# Patient Record
Sex: FEMALE | Race: WHITE | NOT HISPANIC OR LATINO | Employment: FULL TIME | URBAN - METROPOLITAN AREA
[De-identification: names, ages, dates, MRNs, and addresses within clinical notes are randomized per-mention and may not be internally consistent; named-entity substitution may affect disease eponyms.]

---

## 2019-03-07 ENCOUNTER — HOSPITAL ENCOUNTER (OUTPATIENT)
Dept: URGENT CARE | Facility: CLINIC | Age: 39
Discharge: HOME | End: 2019-03-07
Attending: EMERGENCY MEDICINE

## 2019-05-16 ENCOUNTER — HOSPITAL ENCOUNTER (OUTPATIENT)
Dept: MAMMOGRAPHY | Facility: HOSPITAL | Age: 39
Discharge: HOME | End: 2019-05-16
Attending: OBSTETRICS & GYNECOLOGY

## 2019-07-11 ENCOUNTER — HOSPITAL ENCOUNTER (OUTPATIENT)
Dept: URGENT CARE | Facility: CLINIC | Age: 39
Discharge: HOME | End: 2019-07-11
Attending: NURSE PRACTITIONER

## 2019-09-19 ENCOUNTER — HOSPITAL ENCOUNTER (OUTPATIENT)
Dept: URGENT CARE | Facility: CLINIC | Age: 39
Discharge: HOME | End: 2019-09-19
Attending: NURSE PRACTITIONER

## 2019-09-22 LAB — BACTERIA SPEC AEROBE CULT: NORMAL

## 2022-04-07 ENCOUNTER — OFFICE VISIT (OUTPATIENT)
Dept: FAMILY MEDICINE CLINIC | Facility: CLINIC | Age: 42
End: 2022-04-07

## 2022-04-07 VITALS
TEMPERATURE: 97.7 F | HEIGHT: 63 IN | BODY MASS INDEX: 51.91 KG/M2 | SYSTOLIC BLOOD PRESSURE: 138 MMHG | WEIGHT: 293 LBS | OXYGEN SATURATION: 98 % | HEART RATE: 107 BPM | DIASTOLIC BLOOD PRESSURE: 72 MMHG

## 2022-04-07 DIAGNOSIS — J01.00 ACUTE NON-RECURRENT MAXILLARY SINUSITIS: ICD-10-CM

## 2022-04-07 DIAGNOSIS — R30.0 DYSURIA: Primary | ICD-10-CM

## 2022-04-07 PROBLEM — M54.30 SCIATICA: Status: ACTIVE | Noted: 2017-11-09

## 2022-04-07 LAB
BILIRUB BLD-MCNC: NEGATIVE MG/DL
CLARITY, POC: CLEAR
COLOR UR: YELLOW
GLUCOSE UR STRIP-MCNC: NEGATIVE MG/DL
KETONES UR QL: NEGATIVE
LEUKOCYTE EST, POC: NEGATIVE
NITRITE UR-MCNC: NEGATIVE MG/ML
PH UR: 6.5 [PH] (ref 5–8)
PROT UR STRIP-MCNC: NEGATIVE MG/DL
RBC # UR STRIP: NEGATIVE /UL
SP GR UR: 1 (ref 1–1.03)
UROBILINOGEN UR QL: NORMAL

## 2022-04-07 PROCEDURE — 99203 OFFICE O/P NEW LOW 30 MIN: CPT | Performed by: NURSE PRACTITIONER

## 2022-04-07 RX ORDER — CEPHALEXIN 500 MG/1
500 CAPSULE ORAL 2 TIMES DAILY
Qty: 20 CAPSULE | Refills: 0 | Status: SHIPPED | OUTPATIENT
Start: 2022-04-07 | End: 2022-04-17

## 2022-04-07 RX ORDER — FLUCONAZOLE 150 MG/1
150 TABLET ORAL ONCE
Qty: 1 TABLET | Refills: 0 | Status: SHIPPED | OUTPATIENT
Start: 2022-04-07 | End: 2022-04-07

## 2022-04-07 RX ORDER — PUMPKIN SEED EXTRACT/SOY GERM 300 MG
1 CAPSULE ORAL DAILY
Qty: 30 EACH | Refills: 0 | Status: SHIPPED | OUTPATIENT
Start: 2022-04-07 | End: 2022-06-07 | Stop reason: ALTCHOICE

## 2022-04-07 NOTE — PROGRESS NOTES
Chief Complaint  Earache (Right ear X3 weeks) and Urinary Frequency (And painful/burning X3 days)    Subjective        Past Medical History:   Diagnosis Date   • Allergic Age 3   • Asthma Since birth   • Diverticulosis    • Obesity    • Urinary tract infection 3-4-2022    Burning when peeing     Social History     Tobacco Use   • Smoking status: Current Every Day Smoker     Packs/day: 1.00     Years: 15.00     Pack years: 15.00     Types: Cigarettes     Start date: 1996   • Smokeless tobacco: Never Used   Substance Use Topics   • Alcohol use: Never   • Drug use: Never      No current outpatient medications on file prior to visit.     No current facility-administered medications on file prior to visit.      Allergies   Allergen Reactions   • Penicillins Rash and Swelling     Throat swelling        Health Maintenance Due   Topic Date Due   • ANNUAL PHYSICAL  Never done   • COVID-19 Vaccine (1) Never done   • TDAP/TD VACCINES (1 - Tdap) Never done   • HEPATITIS C SCREENING  Never done   • PAP SMEAR  Never done      Ashley Costa presents to [facility]  Here with ear complaint. Right ear feels clogged. Pt states she was on diamox 100mg about 1 month ago. Pain continues and is also to the front of the ear. Sinus feels congested.     Concerns for UTI. States she will have burning when she is about to start her period and at the end of the period. Notes this with the past 2 periods. States she has drank more water, eaten yogurt and drank cranberry juice. Notes urinary frequency and urgency. Denies fever or chills. States small volume voids.  Patient states that her gynecologist told her that she is nearly in full-blown menopause and had some burning with urination can be normal.  Patient also reports that she had a Pap smear that was abnormal and they went in and did a biopsy from inside her uterus was found to have a cyst that was benign but her GYN once her to go in for removal of the  "cyst.  Patient has not followed up in 5 months.    Patient also reports that she will get purplish discoloration of bilateral knuckles and a spot on her right elbow.  Patient is established with cardiology but has not followed up in some time.    Patient states she had labs done about 1 year ago the provider JACK joseph was told everything was normal.    Patient states her mother and stepfather passed away in August 2021, 4 days apart from Jazlyn. (Latha Bowers).  Patient now has custody of 2 young girls.      Objective   Vital Signs:   /72   Pulse 107   Temp 97.7 °F (36.5 °C)   Ht 160 cm (63\")   Wt (!) 138 kg (305 lb)   SpO2 98%   BMI 54.03 kg/m²     Review of Systems   Physical Exam  Vitals reviewed.   Constitutional:       General: She is not in acute distress.     Appearance: Normal appearance. She is well-developed.   HENT:      Head: Normocephalic and atraumatic.      Right Ear: There is mastoid tenderness. Tympanic membrane is bulging.      Left Ear: Hearing, tympanic membrane and ear canal normal. There is mastoid tenderness.   Eyes:      Conjunctiva/sclera: Conjunctivae normal.      Pupils: Pupils are equal, round, and reactive to light.   Cardiovascular:      Rate and Rhythm: Normal rate and regular rhythm.      Heart sounds: Normal heart sounds.   Pulmonary:      Effort: Pulmonary effort is normal.      Breath sounds: Normal breath sounds.   Musculoskeletal:      Cervical back: Neck supple.   Skin:     General: Skin is warm and dry.   Neurological:      Mental Status: She is alert and oriented to person, place, and time.   Psychiatric:         Mood and Affect: Mood and affect normal.         Behavior: Behavior normal.         Thought Content: Thought content normal.         Judgment: Judgment normal.        Result Review :   The following data was reviewed by: FANTA Hilton on 04/07/2022:  UA    Urinalysis 4/7/22   Ketones, UA Negative   Leukocytes, UA Negative                     Assessment " and Plan    Diagnoses and all orders for this visit:    1. Dysuria (Primary)  -     Methenamine-Sodium Salicylate (AZO Urinary Tract Defense) 162-162.5 MG tablet; Take 1 tablet by mouth Daily.  Dispense: 30 each; Refill: 0  -     cephalexin (Keflex) 500 MG capsule; Take 1 capsule by mouth 2 (Two) Times a Day for 10 days.  Dispense: 20 capsule; Refill: 0  -     fluconazole (DIFLUCAN) 150 MG tablet; Take 1 tablet by mouth 1 (One) Time for 1 dose.  Dispense: 1 tablet; Refill: 0  -     POCT urinalysis dipstick, manual    2. Acute non-recurrent maxillary sinusitis  -     cephalexin (Keflex) 500 MG capsule; Take 1 capsule by mouth 2 (Two) Times a Day for 10 days.  Dispense: 20 capsule; Refill: 0      Recommend patient to follow-up with cardiology and gynecology.  Follow Up   Return if symptoms worsen or fail to improve.  Patient was given instructions and counseling regarding her condition or for health maintenance advice. Please see specific information pulled into the AVS if appropriate.

## 2022-04-22 ENCOUNTER — OFFICE VISIT (OUTPATIENT)
Dept: FAMILY MEDICINE CLINIC | Facility: CLINIC | Age: 42
End: 2022-04-22

## 2022-04-22 VITALS
TEMPERATURE: 97.8 F | HEART RATE: 94 BPM | HEIGHT: 63 IN | OXYGEN SATURATION: 98 % | SYSTOLIC BLOOD PRESSURE: 158 MMHG | DIASTOLIC BLOOD PRESSURE: 80 MMHG | WEIGHT: 293 LBS | BODY MASS INDEX: 51.91 KG/M2

## 2022-04-22 DIAGNOSIS — J30.9 ALLERGIC RHINITIS, UNSPECIFIED SEASONALITY, UNSPECIFIED TRIGGER: ICD-10-CM

## 2022-04-22 DIAGNOSIS — J06.9 UPPER RESPIRATORY TRACT INFECTION, UNSPECIFIED TYPE: Primary | ICD-10-CM

## 2022-04-22 DIAGNOSIS — M79.672 LEFT FOOT PAIN: ICD-10-CM

## 2022-04-22 PROCEDURE — 99214 OFFICE O/P EST MOD 30 MIN: CPT | Performed by: NURSE PRACTITIONER

## 2022-04-22 RX ORDER — METHYLPREDNISOLONE 4 MG/1
TABLET ORAL
Qty: 1 EACH | Refills: 0 | Status: SHIPPED | OUTPATIENT
Start: 2022-04-22 | End: 2022-06-07 | Stop reason: ALTCHOICE

## 2022-04-22 RX ORDER — FLUTICASONE PROPIONATE 50 MCG
SPRAY, SUSPENSION (ML) NASAL EVERY 24 HOURS
COMMUNITY
End: 2022-06-07 | Stop reason: ALTCHOICE

## 2022-04-22 RX ORDER — AZITHROMYCIN 250 MG/1
TABLET, FILM COATED ORAL
Qty: 6 TABLET | Refills: 0 | Status: SHIPPED | OUTPATIENT
Start: 2022-04-22 | End: 2022-06-07 | Stop reason: ALTCHOICE

## 2022-04-22 RX ORDER — LORATADINE 10 MG/1
TABLET ORAL EVERY 24 HOURS
COMMUNITY
End: 2022-04-22 | Stop reason: SDUPTHER

## 2022-04-22 RX ORDER — LORATADINE 10 MG/1
10 TABLET ORAL DAILY
Qty: 90 TABLET | Refills: 1 | Status: SHIPPED | OUTPATIENT
Start: 2022-04-22 | End: 2022-09-15

## 2022-04-22 NOTE — PROGRESS NOTES
Chief Complaint  Cough and Nasal Congestion (And green drainage)    Subjective        Past Medical History:   Diagnosis Date   • Allergic Age 3   • Asthma Since birth   • Diverticulosis    • Obesity    • Urinary tract infection 3-4-2022    Burning when peeing     Social History     Tobacco Use   • Smoking status: Current Every Day Smoker     Packs/day: 1.00     Years: 15.00     Pack years: 15.00     Types: Cigarettes     Start date: 1996   • Smokeless tobacco: Never Used   Substance Use Topics   • Alcohol use: Never   • Drug use: Never      Current Outpatient Medications on File Prior to Visit   Medication Sig   • fluticasone (FLONASE) 50 MCG/ACT nasal spray Daily.   • [DISCONTINUED] loratadine (CLARITIN) 10 MG tablet Daily.   • Methenamine-Sodium Salicylate (AZO Urinary Tract Defense) 162-162.5 MG tablet Take 1 tablet by mouth Daily.     No current facility-administered medications on file prior to visit.      Allergies   Allergen Reactions   • Penicillins Rash and Swelling     Throat swelling        Health Maintenance Due   Topic Date Due   • ANNUAL PHYSICAL  Never done   • COVID-19 Vaccine (1) Never done   • Pneumococcal Vaccine 0-64 (1 - PCV) Never done   • TDAP/TD VACCINES (1 - Tdap) Never done   • HEPATITIS C SCREENING  Never done   • PAP SMEAR  Never done      Ashley Costa presents to [facility]  Here with sinus congestion and drainage of yellow mucous. Pt states similar symptoms with others in the home but they have all improved. Drainage started out clear and now is green in the morning. Pt states symptoms improved with previous antibiotic but then symptoms returned. Drainage causes coughing. Noted some chills. Used nebulizer last night.    Home covid test were negative.     Patient notes left forefoot deformity that is worsening.  Patient states it is tender to touch and painful at times.  Patient notes fracture of the foot when she was 13 years old.  Patient notes  "that her cast was placed too tight and had to be removed due to causing poor circulation.      Objective   Vital Signs:   /80 (BP Location: Left arm)   Pulse 94   Temp 97.8 °F (36.6 °C)   Ht 160 cm (63\")   Wt (!) 137 kg (302 lb)   SpO2 98%   BMI 53.50 kg/m²     Review of Systems   Respiratory: Positive for cough and wheezing. Negative for shortness of breath.       Physical Exam  Vitals reviewed.   Constitutional:       General: She is not in acute distress.     Appearance: Normal appearance. She is well-developed.   HENT:      Head: Normocephalic and atraumatic.      Right Ear: External ear normal.      Left Ear: External ear normal.   Eyes:      Conjunctiva/sclera: Conjunctivae normal.      Pupils: Pupils are equal, round, and reactive to light.   Cardiovascular:      Rate and Rhythm: Normal rate and regular rhythm.      Heart sounds: Normal heart sounds.   Pulmonary:      Effort: Pulmonary effort is normal.      Breath sounds: Normal breath sounds.   Musculoskeletal:      Cervical back: Neck supple.      Right lower leg: No edema.      Left lower leg: No edema.      Left foot: Deformity present.        Feet:    Skin:     General: Skin is warm and dry.   Neurological:      Mental Status: She is alert and oriented to person, place, and time.   Psychiatric:         Mood and Affect: Mood and affect normal.         Behavior: Behavior normal.         Thought Content: Thought content normal.         Judgment: Judgment normal.        Result Review :   The following data was reviewed by: FANTA Hilton on 04/22/2022:    Data reviewed: Radiologic studies Left foot x-ray          Assessment and Plan    Diagnoses and all orders for this visit:    1. Upper respiratory tract infection, unspecified type (Primary)  -     azithromycin (Zithromax Z-Aleks) 250 MG tablet; Take 2 tablets by mouth on day 1, then 1 tablet daily on days 2-5  Dispense: 6 tablet; Refill: 0  -     methylPREDNISolone (MEDROL) 4 MG dose " pack; Take as directed on package instructions.  Dispense: 1 each; Refill: 0    2. Allergic rhinitis, unspecified seasonality, unspecified trigger  -     loratadine (CLARITIN) 10 MG tablet; Take 1 tablet by mouth Daily.  Dispense: 90 tablet; Refill: 1    3. Left foot pain  -     XR Foot 3+ View Left (In Office)  -     Ambulatory Referral to Podiatry        Follow Up   Return if symptoms worsen or fail to improve.  Patient was given instructions and counseling regarding her condition or for health maintenance advice. Please see specific information pulled into the AVS if appropriate.       Answers for HPI/ROS submitted by the patient on 4/22/2022  Please describe your symptoms.: Nose stopped up and running green at times and ears clogged and nasel drip in throat making me cough  Have you had these symptoms before?: Yes  How long have you been having these symptoms?: 1-4 days  What is the primary reason for your visit?: Other

## 2022-04-28 ENCOUNTER — TELEPHONE (OUTPATIENT)
Dept: FAMILY MEDICINE CLINIC | Facility: CLINIC | Age: 42
End: 2022-04-28

## 2022-04-28 DIAGNOSIS — J06.9 UPPER RESPIRATORY TRACT INFECTION, UNSPECIFIED TYPE: Primary | ICD-10-CM

## 2022-04-28 RX ORDER — BROMPHENIRAMINE MALEATE, PSEUDOEPHEDRINE HYDROCHLORIDE, AND DEXTROMETHORPHAN HYDROBROMIDE 2; 30; 10 MG/5ML; MG/5ML; MG/5ML
5 SYRUP ORAL 4 TIMES DAILY PRN
Qty: 118 ML | Refills: 0 | Status: SHIPPED | OUTPATIENT
Start: 2022-04-28 | End: 2022-06-07 | Stop reason: ALTCHOICE

## 2022-04-28 NOTE — TELEPHONE ENCOUNTER
Caller: Ashley oCsta    Relationship: Self    Best call back number: [phone]    Who are you requesting to speak with (clinical staff, provider,  specific staff member): CLINICAL    What was the call regarding: PATIENT STATES THE Z-SPEEDY HELPED BUT SHE IS STILL CONGESTED. PATIENT WOULD LIKE SOMETHING CALLED IN FOR THE CONGESTION OR A CALL TO ADVISE HER ON RECOMMENDED CARE.     PHARMACY:  [address] [phone] [facility] [phone] [facility]     Do you require a callback: IF NECESSARY

## 2022-06-07 ENCOUNTER — OFFICE VISIT (OUTPATIENT)
Dept: PODIATRY | Facility: CLINIC | Age: 42
End: 2022-06-07

## 2022-06-07 VITALS
DIASTOLIC BLOOD PRESSURE: 78 MMHG | HEIGHT: 63 IN | SYSTOLIC BLOOD PRESSURE: 140 MMHG | BODY MASS INDEX: 51.91 KG/M2 | OXYGEN SATURATION: 97 % | TEMPERATURE: 97.3 F | HEART RATE: 94 BPM | WEIGHT: 293 LBS

## 2022-06-07 DIAGNOSIS — M79.672 FOOT PAIN, LEFT: Primary | ICD-10-CM

## 2022-06-07 DIAGNOSIS — M67.479 GANGLION CYST OF FOOT: ICD-10-CM

## 2022-06-07 PROCEDURE — 99243 OFF/OP CNSLTJ NEW/EST LOW 30: CPT | Performed by: PODIATRIST

## 2022-06-07 NOTE — PROGRESS NOTES
[facility] - PODIATRY    Today's Date: 06/07/22    Patient Name: Ashley Costa  MRN: [id number]  CSN: [id number]  PCP: Kecia Gusman APRN, Last PCP Visit:  4/22/2022  Referring Provider: Kecia Gusman APRN    SUBJECTIVE     Chief Complaint   Patient presents with   • Left Foot - Pain, Establish Care     HPI: Ashley Costa, a 41 y.o.female, presents to clinic.    New, Established, New Problem:  new    Location:  Dorsal Left midfoot    Duration:  Summer 2020    Onset:  insidious    Nature:  Cyst formation    Stable, worsening, improving:  Slowly worsening    Aggravating factors:  Patient relates pain is aggravated by shoe gear and ambulation.      Previous Treatment:  X-ray    Patient denies any fevers, chills, nausea, vomiting, shortness of breath, nor any other constitutional signs nor symptoms.    No other pedal complaints at this time.      Past Medical History:   Diagnosis Date   • Allergic Age 3   • Asthma Since birth   • Diverticulosis    • Obesity    • Urinary tract infection 3-4-2022    Burning when peeing     Past Surgical History:   Procedure Laterality Date   • D & C AND LAPAROSCOPY     • ESSURE TUBAL LIGATION     • WISDOM TOOTH EXTRACTION       Family History   Problem Relation Age of Onset   • Diabetes Mother    • Asthma Mother    • Hyperlipidemia Mother    • Hypertension Mother    • Heart disease Father    • Asthma Son    • Cancer Neg Hx    • Stroke Neg Hx      Social History     Socioeconomic History   • Marital status: Single   Tobacco Use   • Smoking status: Current Every Day Smoker     Packs/day: 1.00     Years: 15.00     Pack years: 15.00     Types: Cigarettes     Start date: 1996   • Smokeless tobacco: Never Used   Vaping Use   • Vaping Use: Never used   Substance and Sexual Activity   • Alcohol use: Never   • Drug use: Never   • Sexual activity: Defer     Comment: Issures     Allergies   Allergen Reactions   • Penicillins Rash and Swelling     Throat swelling        Current Outpatient Medications   Medication Sig Dispense Refill   • loratadine (CLARITIN) 10 MG tablet Take 1 tablet by mouth Daily. 90 tablet 1     No current facility-administered medications for this visit.     Review of Systems   Constitutional: Negative.    Musculoskeletal:        Cyst formation on dorsal Left midfoot   All other systems reviewed and are negative.      OBJECTIVE     Vitals:    06/07/22 1424   BP: 140/78   Pulse: 94   Temp: 97.3 °F (36.3 °C)   SpO2: 97%       No results found for: WBC, RBC, HGB, HCT, MCV, MCH, MCHC, RDW, RDWSD, MPV, PLT, NEUTRORELPCT, LYMPHORELPCT, MONORELPCT, EOSRELPCT, BASORELPCT, AUTOIGPER, NEUTROABS, LYMPHSABS, MONOSABS, EOSABS, BASOSABS, AUTOIGNUM, NRBC      No results found for: GLUCOSE, BUN, CREATININE, EGFRIFNONA, EGFRIFAFRI, BCR, K, CO2, CALCIUM, PROTENTOTREF, ALBUMIN, LABIL2, BILIRUBIN, AST, ALT    Patient seen in no apparent distress.      PHYSICAL EXAM:     Foot/Ankle Exam:       General:   Appearance comment:  Morbid obesity  Orientation: AAOx3    Affect: appropriate    Gait: unimpaired    Shoe Gear:  Sandals    VASCULAR      Right Foot Vascularity   Normal vascular exam    Dorsalis pedis:  2+  Posterior tibial:  2+  Skin Temperature: warm    Edema Grading:  None  CFT:  < 3 seconds  Pedal Hair Growth:  Present  Varicosities: mild varicosities       Left Foot Vascularity   Normal vascular exam    Dorsalis pedis:  2+  Posterior tibial:  2+  Skin Temperature: warm    Edema Grading:  None  CFT:  < 3 seconds  Pedal Hair Growth:  Present  Varicosities: mild varicosities        NEUROLOGIC     Right Foot Neurologic   Normal sensation    Light touch sensation:  Normal  Vibratory sensation:  Normal  Hot/Cold sensation: normal    Protective Sensation using [employer] Monofilament:  10     Left Foot Neurologic   Normal sensation    Light touch sensation:  Normal  Vibratory sensation:  Normal  Hot/cold sensation: normal    Protective Sensation using [employer]  Monofilament:  10     MUSCULOSKELETAL      Left Foot Musculoskeletal   Tenderness comment:  Dorsal medial midfoot     MUSCLE STRENGTH     Right Foot Muscle Strength   Foot dorsiflexion:  4-  Foot plantar flexion:  4-  Foot inversion:  4-  Foot eversion:  4-     Left Foot Muscle Strength   Foot dorsiflexion:  4-  Foot plantar flexion:  4-  Foot inversion:  4-  Foot eversion:  4-     RANGE OF MOTION      Right Foot Range of Motion   Foot and ankle ROM within normal limits       Left Foot Range of Motion   Foot and ankle ROM within normal limits       DERMATOLOGIC     Right Foot Dermatologic   Skin: skin intact    Nails: normal       Left Foot Dermatologic   Skin: skin intact    Nails: normal        Left Foot Additional Comments: Cyst formation dorsal medial midfoot.  This area correlates with degenerative changes on x-ray.  No signs of edema, erythema, lymphangitis, nor signs of infection.        RADIOLOGY:      PROCEDURE:  XR FOOT 3+ VW LEFT     COMPARISON: [facility], CR, ANKLE >OR= 3V LT, 7/29/2017, 18:26.     INDICATIONS:  left foot pain/deformity     FINDINGS:          BONES:             There is a small plantar calcaneal enthesophyte.  Degenerative changes are present in the   midfoot.  No evidence of acute fracture or dislocation.  SOFT TISSUES:            Negative.  No visible soft tissue swelling.    EFFUSION:       None visible.    OTHER:             Negative.       IMPRESSION:               Degenerative change.  No acute osseous abnormalities are identified.               GLORIA ANTHONY MD         Electronically Signed and Approved By: GLORIA ANTHONY MD on 4/22/2022 at 16:18      ASSESSMENT/PLAN     Diagnoses and all orders for this visit:    1. Foot pain, left (Primary)    2. Ganglion cyst of foot        Comprehensive lower extremity examination and evaluation was performed.    Discussed findings and treatment plan including risks, benefits, and treatment options with patient in detail. Patient agreed  with treatment plan.    Medications and allergies reviewed.  Reviewed available lab values along with other pertinent labs.  These were discussed with the patient.    Recommended surgery: Given cyst excision from dorsal left midfoot.  Upon discussion of surgical correction, post-operative requirements along with risk and benefits of the surgery, the patient states they do not want to pursue surgery at this time.      Patient is to monitor for recurrence and any new symptoms and to contact Dr. Tavares's office for a follow-up appointment.      The patient states understanding and agreement with this plan.    An After Visit Summary was printed and given to the patient at discharge, including (if requested) any available informative/educational handouts regarding diagnosis, treatment, or medications. All questions were answered to patient/family satisfaction. Should symptoms fail to improve or worsen they agree to call or return to clinic or to go to the Emergency Department. Discussed the importance of following up with any needed screening tests/labs/specialist appointments and any requested follow-up recommended by me today. Importance of maintaining follow-up discussed and patient accepts that missed appointments can delay diagnosis and potentially lead to worsening of conditions.    Return if symptoms worsen or fail to improve., or sooner if acute issues arise.    This document has been electronically signed by Rinku Tavares DPM on June 7, 2022 14:49 EDT

## 2022-09-15 ENCOUNTER — OFFICE VISIT (OUTPATIENT)
Dept: FAMILY MEDICINE CLINIC | Facility: CLINIC | Age: 42
End: 2022-09-15

## 2022-09-15 VITALS
TEMPERATURE: 96.6 F | DIASTOLIC BLOOD PRESSURE: 96 MMHG | HEART RATE: 95 BPM | SYSTOLIC BLOOD PRESSURE: 146 MMHG | OXYGEN SATURATION: 99 % | BODY MASS INDEX: 52.01 KG/M2 | WEIGHT: 293 LBS

## 2022-09-15 DIAGNOSIS — M54.50 ACUTE LEFT-SIDED LOW BACK PAIN WITHOUT SCIATICA: Primary | ICD-10-CM

## 2022-09-15 LAB
ANION GAP SERPL CALCULATED.3IONS-SCNC: 11 MMOL/L (ref 5–15)
BILIRUB BLD-MCNC: NEGATIVE MG/DL
BUN SERPL-MCNC: 12 MG/DL (ref 6–20)
BUN/CREAT SERPL: 21.1 (ref 7–25)
CALCIUM SPEC-SCNC: 9.9 MG/DL (ref 8.6–10.5)
CHLORIDE SERPL-SCNC: 104 MMOL/L (ref 98–107)
CLARITY, POC: CLEAR
CO2 SERPL-SCNC: 23 MMOL/L (ref 22–29)
COLOR UR: YELLOW
CREAT SERPL-MCNC: 0.57 MG/DL (ref 0.57–1)
EGFRCR SERPLBLD CKD-EPI 2021: 117.3 ML/MIN/1.73
EXPIRATION DATE: ABNORMAL
GLUCOSE SERPL-MCNC: 95 MG/DL (ref 65–99)
GLUCOSE UR STRIP-MCNC: NEGATIVE MG/DL
KETONES UR QL: NEGATIVE
LEUKOCYTE EST, POC: ABNORMAL
Lab: ABNORMAL
NITRITE UR-MCNC: NEGATIVE MG/ML
PH UR: 7 [PH] (ref 5–8)
POTASSIUM SERPL-SCNC: 4.5 MMOL/L (ref 3.5–5.2)
PROT UR STRIP-MCNC: NEGATIVE MG/DL
RBC # UR STRIP: NEGATIVE /UL
SODIUM SERPL-SCNC: 138 MMOL/L (ref 136–145)
SP GR UR: 1.01 (ref 1–1.03)
UROBILINOGEN UR QL: ABNORMAL

## 2022-09-15 PROCEDURE — 99213 OFFICE O/P EST LOW 20 MIN: CPT | Performed by: NURSE PRACTITIONER

## 2022-09-15 PROCEDURE — 96372 THER/PROPH/DIAG INJ SC/IM: CPT | Performed by: NURSE PRACTITIONER

## 2022-09-15 PROCEDURE — 80048 BASIC METABOLIC PNL TOTAL CA: CPT | Performed by: NURSE PRACTITIONER

## 2022-09-15 RX ORDER — METHYLPREDNISOLONE 4 MG/1
TABLET ORAL
Qty: 1 EACH | Refills: 0 | Status: SHIPPED | OUTPATIENT
Start: 2022-09-15 | End: 2022-10-15

## 2022-09-15 RX ORDER — TIZANIDINE 4 MG/1
4 TABLET ORAL NIGHTLY PRN
Qty: 30 TABLET | Refills: 0 | Status: SHIPPED | OUTPATIENT
Start: 2022-09-15 | End: 2022-10-15

## 2022-09-15 RX ORDER — KETOROLAC TROMETHAMINE 30 MG/ML
30 INJECTION, SOLUTION INTRAMUSCULAR; INTRAVENOUS ONCE
Status: DISCONTINUED | OUTPATIENT
Start: 2022-09-15 | End: 2022-09-15

## 2022-09-15 RX ORDER — KETOROLAC TROMETHAMINE 30 MG/ML
30 INJECTION, SOLUTION INTRAMUSCULAR; INTRAVENOUS ONCE
Status: COMPLETED | OUTPATIENT
Start: 2022-09-15 | End: 2022-09-15

## 2022-09-15 RX ORDER — NAPROXEN 500 MG/1
TABLET ORAL
Qty: 60 TABLET | Refills: 0 | Status: SHIPPED | OUTPATIENT
Start: 2022-09-15 | End: 2022-10-15

## 2022-09-15 RX ADMIN — KETOROLAC TROMETHAMINE 30 MG: 30 INJECTION, SOLUTION INTRAMUSCULAR; INTRAVENOUS at 12:46

## 2022-09-15 NOTE — PROGRESS NOTES
Chief Complaint  Back Pain (LT sided back pain )    Subjective            Ashley Costa presents to [facility]  History of Present Illness     Patient presents to the office today with 2-3 history of acute onset left-sided back pain.  No known injury, but she states that she does have a history of chronic lower back pain with sciatic nerve involvement.  Today, she denies any sciatica.  Denies any pain in the lower extremities; no numbness or tingling.  She denies any loss of bowel or bladder.  She has been taking over-the-counter ibuprofen, 400 mg, and Tylenol, without relief of symptoms.  She states in the past, she would see her previous primary care provider and they would give her Toradol injections and that would help her out a lot.  She also took steroids for her symptoms in the past.    She denies any gross hematuria.  Denies any burning, urgency, or frequency.  She does endorse occasional stress incontinence.        Past Medical History:   Diagnosis Date   • Allergic Age 3   • Asthma Since birth   • Diverticulosis    • Obesity    • Urinary tract infection 3-4-2022    Burning when peeing       Allergies   Allergen Reactions   • Penicillins Rash and Swelling     Throat swelling          Past Surgical History:   Procedure Laterality Date   • D & C AND LAPAROSCOPY     • ESSURE TUBAL LIGATION     • WISDOM TOOTH EXTRACTION          Social History     Tobacco Use   • Smoking status: Current Every Day Smoker     Packs/day: 1.00     Years: 15.00     Pack years: 15.00     Types: Cigarettes     Start date: 1996   • Smokeless tobacco: Never Used   Vaping Use   • Vaping Use: Never used   Substance Use Topics   • Alcohol use: Never   • Drug use: Never       Family History   Problem Relation Age of Onset   • Diabetes Mother    • Asthma Mother    • Hyperlipidemia Mother    • Hypertension Mother    • Heart disease Father    • Asthma Son    • Cancer Neg Hx    • Stroke Neg Hx         Health  Maintenance Due   Topic Date Due   • COVID-19 Vaccine (1) Never done   • ANNUAL PHYSICAL  Never done   • Pneumococcal Vaccine 0-64 (1 - PCV) Never done   • TDAP/TD VACCINES (1 - Tdap) Never done   • HEPATITIS C SCREENING  Never done   • PAP SMEAR  Never done        Current Outpatient Medications on File Prior to Visit   Medication Sig   • [DISCONTINUED] azithromycin (Zithromax Z-Aleks) 250 MG tablet Take 2 tablets by mouth on day 1, then 1 tablet daily on days 2-5   • [DISCONTINUED] diclofenac (VOLTAREN) 50 MG EC tablet Take 1 tablet by mouth 3 (Three) Times a Day. (Patient not taking: No sig reported)   • [DISCONTINUED] fluticasone (FLONASE) 50 MCG/ACT nasal spray Daily.   • [DISCONTINUED] loratadine (CLARITIN) 10 MG tablet Take 1 tablet by mouth Daily.   • [DISCONTINUED] methylPREDNISolone (MEDROL) 4 MG dose pack Take as directed on package instructions.     No current facility-administered medications on file prior to visit.         There is no immunization history on file for this patient.    Review of Systems     Objective     /96   Pulse 95   Temp 96.6 °F (35.9 °C)   Wt (!) 137 kg (303 lb)   SpO2 99%   BMI 52.01 kg/m²       Physical Exam  Vitals reviewed.   Constitutional:       General: She is not in acute distress.     Appearance: She is well-developed. She is obese.   HENT:      Head: Normocephalic and atraumatic.   Eyes:      General: No scleral icterus.     Extraocular Movements: Extraocular movements intact.      Conjunctiva/sclera: Conjunctivae normal.   Cardiovascular:      Rate and Rhythm: Normal rate and regular rhythm.      Pulses: Normal pulses.      Heart sounds: No murmur heard.  Pulmonary:      Effort: Pulmonary effort is normal. No respiratory distress.      Breath sounds: Normal breath sounds. No wheezing, rhonchi or rales.   Abdominal:      General: Bowel sounds are normal. There is no distension.      Palpations: Abdomen is soft. There is no mass.      Tenderness: There is no  abdominal tenderness. There is no right CVA tenderness, left CVA tenderness, guarding or rebound.   Musculoskeletal:      Thoracic back: Tenderness present. No swelling, deformity, spasms or bony tenderness. Decreased range of motion. No scoliosis.      Lumbar back: Tenderness present. No swelling, deformity, spasms or bony tenderness. Decreased range of motion. Negative right straight leg raise test and negative left straight leg raise test. No scoliosis.        Back:    Skin:     General: Skin is warm and dry.   Neurological:      Mental Status: She is alert and oriented to person, place, and time.   Psychiatric:         Mood and Affect: Mood and affect normal.         Behavior: Behavior normal.         Thought Content: Thought content normal.         Judgment: Judgment normal.         Result Review :     The following data was reviewed by: FANTA Sanchez on 09/15/2022:    POCT urinalysis dipstick, automated (09/15/2022 09:57)                Assessment and Plan      Diagnoses and all orders for this visit:    1. Acute left-sided low back pain without sciatica (Primary)  -     POCT urinalysis dipstick, automated  -     Basic metabolic panel  -     methylPREDNISolone (MEDROL) 4 MG dose pack; Take as directed on package instructions.  Dispense: 1 each; Refill: 0  -     tiZANidine (ZANAFLEX) 4 MG tablet; Take 1 tablet by mouth At Night As Needed for Muscle Spasms.  Dispense: 30 tablet; Refill: 0  -     naproxen (Naprosyn) 500 MG tablet; Take 1 tablet PO BID. Do not start until 09/16/2022.  Dispense: 60 tablet; Refill: 0  -     ketorolac (TORADOL) injection 30 mg  -     Discontinue: ketorolac (TORADOL) injection 30 mg            Follow Up     Return if symptoms worsen or fail to improve.     Urinalysis in the office not suggestive of urinary tract infection.  No hematuria on urinalysis, thus kidney stone is thought to be less likely.  She does have a history of lower back pain with sciatica.  No sciatica on  this occasion.  We will give her a Toradol shot today, 30 mg IM.  I will give her prescription naproxen to use starting tomorrow.  I will also provide her with a Medrol Dosepak and Zanaflex to use as needed.  She has been instructed to cut this in half for any daytime somnolence, or just take at nighttime as needed.  Voiced understanding.    She will follow-up in the office with progressive/worsening symptoms, or without resolution of symptoms within the next 7 to 10 days.  Consider lumbar spine x-ray with persistence.    Patient was given instructions and counseling regarding her condition or for health maintenance advice. Please see specific information pulled into the AVS if appropriate.

## 2022-09-15 NOTE — PROGRESS NOTES
Venipuncture Blood Specimen Collection  Venipuncture performed in left hand  by Angelica Davis with good hemostasis. Patient tolerated the procedure well without complications.      09/15/22   Angelica Davis

## 2022-11-29 ENCOUNTER — TELEMEDICINE (OUTPATIENT)
Dept: FAMILY MEDICINE CLINIC | Facility: TELEHEALTH | Age: 42
End: 2022-11-29

## 2022-11-29 VITALS — BODY MASS INDEX: 50.02 KG/M2 | TEMPERATURE: 97.6 F | WEIGHT: 293 LBS | HEIGHT: 64 IN

## 2022-11-29 DIAGNOSIS — J01.40 ACUTE PANSINUSITIS, RECURRENCE NOT SPECIFIED: Primary | ICD-10-CM

## 2022-11-29 DIAGNOSIS — J02.9 SORE THROAT: ICD-10-CM

## 2022-11-29 PROCEDURE — 99213 OFFICE O/P EST LOW 20 MIN: CPT | Performed by: NURSE PRACTITIONER

## 2022-11-29 RX ORDER — GUAIFENESIN 600 MG/1
600 TABLET, EXTENDED RELEASE ORAL 2 TIMES DAILY
Qty: 28 TABLET | Refills: 0 | Status: SHIPPED | OUTPATIENT
Start: 2022-11-29 | End: 2022-12-13

## 2022-11-29 RX ORDER — METHYLPREDNISOLONE 4 MG/1
TABLET ORAL
Qty: 21 TABLET | Refills: 0 | Status: SHIPPED | OUTPATIENT
Start: 2022-11-29 | End: 2023-02-15 | Stop reason: SDUPTHER

## 2022-11-29 RX ORDER — DOXYCYCLINE 100 MG/1
100 CAPSULE ORAL 2 TIMES DAILY
Qty: 10 CAPSULE | Refills: 0 | Status: SHIPPED | OUTPATIENT
Start: 2022-11-29 | End: 2022-12-04

## 2022-11-29 NOTE — PATIENT INSTRUCTIONS
Sinusitis, Adult  Sinusitis is inflammation of your sinuses. Sinuses are hollow spaces in the bones around your face. Your sinuses are located:  Around your eyes.  In the middle of your forehead.  Behind your nose.  In your cheekbones.  Mucus normally drains out of your sinuses. When your nasal tissues become inflamed or swollen, mucus can become trapped or blocked. This allows bacteria, viruses, and fungi to grow, which leads to infection. Most infections of the sinuses are caused by a virus.  Sinusitis can develop quickly. It can last for up to 4 weeks (acute) or for more than 12 weeks (chronic). Sinusitis often develops after a cold.  What are the causes?  This condition is caused by anything that creates swelling in the sinuses or stops mucus from draining. This includes:  Allergies.  Asthma.  Infection from bacteria or viruses.  Deformities or blockages in your nose or sinuses.  Abnormal growths in the nose (nasal polyps).  Pollutants, such as chemicals or irritants in the air.  Infection from fungi (rare).  What increases the risk?  You are more likely to develop this condition if you:  Have a weak body defense system (immune system).  Do a lot of swimming or diving.  Overuse nasal sprays.  Smoke.  What are the signs or symptoms?  The main symptoms of this condition are pain and a feeling of pressure around the affected sinuses. Other symptoms include:  Stuffy nose or congestion.  Thick drainage from your nose.  Swelling and warmth over the affected sinuses.  Headache.  Upper toothache.  A cough that may get worse at night.  Extra mucus that collects in the throat or the back of the nose (postnasal drip).  Decreased sense of smell and taste.  Fatigue.  A fever.  Sore throat.  Bad breath.  How is this diagnosed?  This condition is diagnosed based on:  Your symptoms.  Your medical history.  A physical exam.  Tests to find out if your condition is acute or chronic. This may include:  Checking your nose for nasal  polyps.  Viewing your sinuses using a device that has a light (endoscope).  Testing for allergies or bacteria.  Imaging tests, such as an MRI or CT scan.  In rare cases, a bone biopsy may be done to rule out more serious types of fungal sinus disease.  How is this treated?  Treatment for sinusitis depends on the cause and whether your condition is chronic or acute.  If caused by a virus, your symptoms should go away on their own within 10 days. You may be given medicines to relieve symptoms. They include:  Medicines that shrink swollen nasal passages (topical intranasal decongestants).  Medicines that treat allergies (antihistamines).  A spray that eases inflammation of the nostrils (topical intranasal corticosteroids).  Rinses that help get rid of thick mucus in your nose (nasal saline washes).  If caused by bacteria, your health care provider may recommend waiting to see if your symptoms improve. Most bacterial infections will get better without antibiotic medicine. You may be given antibiotics if you have:  A severe infection.  A weak immune system.  If caused by narrow nasal passages or nasal polyps, you may need to have surgery.  Follow these instructions at home:  Medicines  Take, use, or apply over-the-counter and prescription medicines only as told by your health care provider. These may include nasal sprays.  If you were prescribed an antibiotic medicine, take it as told by your health care provider. Do not stop taking the antibiotic even if you start to feel better.  Hydrate and humidify    Drink enough fluid to keep your urine pale yellow. Staying hydrated will help to thin your mucus.  Use a cool mist humidifier to keep the humidity level in your home above 50%.  Inhale steam for 10-15 minutes, 3-4 times a day, or as told by your health care provider. You can do this in the bathroom while a hot shower is running.  Limit your exposure to cool or dry air.  Rest  Rest as much as possible.  Sleep with your  head raised (elevated).  Make sure you get enough sleep each night.  General instructions    Apply a warm, moist washcloth to your face 3-4 times a day or as told by your health care provider. This will help with discomfort.  Wash your hands often with soap and water to reduce your exposure to germs. If soap and water are not available, use hand .  Do not smoke. Avoid being around people who are smoking (secondhand smoke).  Keep all follow-up visits as told by your health care provider. This is important.  Contact a health care provider if:  You have a fever.  Your symptoms get worse.  Your symptoms do not improve within 10 days.  Get help right away if:  You have a severe headache.  You have persistent vomiting.  You have severe pain or swelling around your face or eyes.  You have vision problems.  You develop confusion.  Your neck is stiff.  You have trouble breathing.  Summary  Sinusitis is soreness and inflammation of your sinuses. Sinuses are hollow spaces in the bones around your face.  This condition is caused by nasal tissues that become inflamed or swollen. The swelling traps or blocks the flow of mucus. This allows bacteria, viruses, and fungi to grow, which leads to infection.  If you were prescribed an antibiotic medicine, take it as told by your health care provider. Do not stop taking the antibiotic even if you start to feel better.  Keep all follow-up visits as told by your health care provider. This is important.  This information is not intended to replace advice given to you by your health care provider. Make sure you discuss any questions you have with your health care provider.  Document Revised: 05/20/2019 Document Reviewed: 05/20/2019  [employer] Patient Education © 2022 [employer]

## 2022-11-29 NOTE — PROGRESS NOTES
You have chosen to receive care through a telehealth visit.  Do you consent to use a video/audio connection for your medical care today? Yes     CHIEF COMPLAINT  Cc: Sore throat ,earache    HPI  Ashley Costa is a 42 y.o. female  presents with complaint of sore throat, ear ache. She reports that she has had her symptoms for over a week now. She reports green nasal congestion and sinus pain and pressure also. She went to the Urgent Care on 11/23/2022 and was diagnosed with maxillary sinusitis. She was prescribed oxymetazoline. She his not feeling better. She has done two home COVID and the results of both tests were negative.    Review of Systems   Constitutional: Negative for fatigue. Fever: feels hot but no fever.   HENT: Positive for congestion (green), ear pain (bilateral>left), sinus pressure, sinus pain and sore throat. Negative for postnasal drip, rhinorrhea and sneezing.    Respiratory: Negative for cough, chest tightness, shortness of breath and wheezing.    Cardiovascular: Negative for chest pain.   Gastrointestinal: Negative for diarrhea, nausea and vomiting.   Musculoskeletal: Negative for myalgias.   Neurological: Negative for headaches.       Past Medical History:   Diagnosis Date   • Allergic Age 3   • Asthma Since birth   • Diverticulosis    • Obesity    • Urinary tract infection 3-4-2022    Burning when peeing       Family History   Problem Relation Age of Onset   • Diabetes Mother    • Asthma Mother    • Hyperlipidemia Mother    • Hypertension Mother    • Heart disease Father    • Asthma Son    • Cancer Neg Hx    • Stroke Neg Hx        Social History     Socioeconomic History   • Marital status: Single   Tobacco Use   • Smoking status: Every Day     Packs/day: 1.00     Years: 15.00     Pack years: 15.00     Types: Cigarettes     Start date: 1996   • Smokeless tobacco: Never   Vaping Use   • Vaping Use: Never used   Substance and Sexual Activity   • Alcohol use: Never   • Drug use: Never   •  "Sexual activity: Defer     Comment: Issures       Ashley Costa  reports that she has been smoking cigarettes. She started smoking about 26 years ago. She has a 15.00 pack-year smoking history. She has never used smokeless tobacco.. I have educated her on the risk of diseases from using tobacco products such as cancer, COPD and heart disease.     I advised her to quit and she is not willing to quit.    I spent 3  minutes counseling the patient.    Temp 97.6 °F (36.4 °C)   Ht 162.6 cm (64\")   Wt (!) 137 kg (301 lb)   LMP 11/07/2022   Breastfeeding No   BMI 51.67 kg/m²     PHYSICAL EXAM  Physical Exam   Constitutional: She is oriented to person, place, and time. She appears well-developed and well-nourished.   HENT:   Head: Normocephalic and atraumatic.   Right Ear: There is tenderness (>left).   Left Ear: There is tenderness (>left).   Nose: Congestion present. Right sinus exhibits maxillary sinus tenderness (patient directed exam) and frontal sinus tenderness (patient directed exam). Left sinus exhibits maxillary sinus tenderness (patient directed exam) and frontal sinus tenderness (patient directed exam).   Eyes: Lids are normal. Right eye exhibits no discharge and no exudate. Left eye exhibits no discharge and no exudate. Right conjunctiva is not injected. Left conjunctiva is not injected.   Pulmonary/Chest: No accessory muscle usage. No tachypnea and no bradypnea.  No respiratory distress.No use of oxygen by nasal cannulaNo use of oxygen by mask noted.  Abdominal: Abdomen appears normal.   Neurological: She is alert and oriented to person, place, and time. No cranial nerve deficit.   Skin: Her skin appears normal.  Psychiatric: She has a normal mood and affect. Her speech is normal and behavior is normal. Judgment and thought content normal.       Results for orders placed or performed in visit on 09/15/22   Basic metabolic panel    Specimen: Blood   Result Value Ref Range    Glucose 95 65 - 99 mg/dL    " BUN 12 6 - 20 mg/dL    Creatinine 0.57 0.57 - 1.00 mg/dL    Sodium 138 136 - 145 mmol/L    Potassium 4.5 3.5 - 5.2 mmol/L    Chloride 104 98 - 107 mmol/L    CO2 23.0 22.0 - 29.0 mmol/L    Calcium 9.9 8.6 - 10.5 mg/dL    BUN/Creatinine Ratio 21.1 7.0 - 25.0    Anion Gap 11.0 5.0 - 15.0 mmol/L    eGFR 117.3 >60.0 mL/min/1.73   POCT urinalysis dipstick, automated    Specimen: Urine   Result Value Ref Range    Color Yellow Yellow, Straw, Dark Yellow, Leslie    Clarity, UA Clear Clear    Specific Gravity  1.010 1.005 - 1.030    pH, Urine 7.0 5.0 - 8.0    Leukocytes Trace (A) Negative    Nitrite, UA Negative Negative    Protein, POC Negative Negative mg/dL    Glucose, UA Negative Negative mg/dL    Ketones, UA Negative Negative    Urobilinogen, UA 0.2 E.U./dL Normal, 0.2 E.U./dL    Bilirubin Negative Negative    Blood, UA Negative Negative    Lot Number [id number]     Expiration Date 2-        Diagnoses and all orders for this visit:    1. Acute pansinusitis, recurrence not specified (Primary)    Other orders  -     guaiFENesin (Mucinex) 600 MG 12 hr tablet; Take 1 tablet by mouth 2 (Two) Times a Day for 14 days.  Dispense: 28 tablet; Refill: 0  -     methylPREDNISolone (MEDROL) 4 MG dose pack; Take as directed on package instructions.  Dispense: 21 tablet; Refill: 0  -     doxycycline (MONODOX) 100 MG capsule; Take 1 capsule by mouth 2 (Two) Times a Day for 5 days.  Dispense: 10 capsule; Refill: 0    Take the doxycycline on an empty stomach with a full glass of water. Do not take minerals or vitamins with minerals with this antibiotic as it decreases the therapeutic value of this antibiotic related to absorption.  Probiotics for two weeks related to taking antibiotics. The pharmacist can help you with this if needed. Do not take within two hours of antibiotic.  Mucinex with plenty of fluids especially water to thin secretions and help with congestion.  Take medrol dose blanca with food as early in the day as  possible  Do not take medrol dose blanca with nsaids such as ibuprofen, aleve, or aspirin  May take tylenol for pain or fever  Hydrate well  May gargle with warm salt water  May try hot tea with lemon and honey    FOLLOW-UP  If symptoms worsen or persist follow up with PCP.Virtual Care or Urgent Care      Patient verbalizes understanding of medication dosage, comfort measures, instructions for treatment and follow-up.    Marjorieselena Feliz, APRN  11/29/2022  15:20 EST    The use of a video visit has been reviewed with the patient and verbal informed consent has been obtained. Myself and Ashley Costa participated in this visit. The patient is located in [address] [place] [address].    I am located in [place], [place]. Mychart and Zoom were utilized. I spent 25 minutes in the patient's chart for this visit.

## 2022-12-02 ENCOUNTER — OFFICE VISIT (OUTPATIENT)
Dept: FAMILY MEDICINE CLINIC | Facility: CLINIC | Age: 42
End: 2022-12-02

## 2022-12-02 VITALS — OXYGEN SATURATION: 100 % | WEIGHT: 293 LBS | BODY MASS INDEX: 52.01 KG/M2 | TEMPERATURE: 97.3 F | HEART RATE: 81 BPM

## 2022-12-02 DIAGNOSIS — R59.0 ADENOPATHY, CERVICAL: ICD-10-CM

## 2022-12-02 DIAGNOSIS — J02.9 SORE THROAT: Primary | ICD-10-CM

## 2022-12-02 PROCEDURE — 99214 OFFICE O/P EST MOD 30 MIN: CPT | Performed by: NURSE PRACTITIONER

## 2022-12-02 RX ORDER — AZITHROMYCIN 250 MG/1
TABLET, FILM COATED ORAL
Qty: 6 TABLET | Refills: 0 | Status: SHIPPED | OUTPATIENT
Start: 2022-12-02 | End: 2023-02-15

## 2022-12-02 NOTE — PROGRESS NOTES
Chief Complaint  Facial Swelling (Neck swollen)    Subjective          Ashley Costa presents to [facility]  History of Present Illness  Patient presents with complaint of sore throat that started on November 20.  Symptoms increased and she went to the urgent care on the 23rd.  She reports no testing was done.  She was diagnosed with a virus.  Possibly a sinusitis and fluid in her ears.  No medication was given.  She was advised to gargle with salt water.    Symptoms did not improve.  2 days ago she was still feeling sore and her throat felt swollen.  She participated in a telehealth visit.  She was given doxycycline and methylprednisolone.  The sore throat has improved but her throat still feels swollen.  She also reports having a significant amount of greenish-yellow postnasal drainage.  She denies cough.  No shortness of breath.  No fever.  She has not tolerated the doxycycline.  She is having stomach cramps and her upper back hurts after taking the medication.                Past Medical History:   Diagnosis Date   • Allergic Age 3   • Asthma Since birth   • Diverticulosis    • Obesity    • Urinary tract infection 3-4-2022    Burning when peeing       Allergies   Allergen Reactions   • Penicillin G Anaphylaxis        Past Surgical History:   Procedure Laterality Date   • D & C AND LAPAROSCOPY     • ESSURE TUBAL LIGATION     • WISDOM TOOTH EXTRACTION          Social History     Socioeconomic History   • Marital status: Single   Tobacco Use   • Smoking status: Every Day     Packs/day: 1.00     Years: 15.00     Pack years: 15.00     Types: Cigarettes     Start date: 1996   • Smokeless tobacco: Never   Vaping Use   • Vaping Use: Never used   Substance and Sexual Activity   • Alcohol use: Never   • Drug use: Never   • Sexual activity: Defer     Comment: Issures       Family History   Problem Relation Age of Onset   • Diabetes Mother    • Asthma Mother    • Hyperlipidemia Mother     • Hypertension Mother    • Heart disease Father    • Asthma Son    • Cancer Neg Hx    • Stroke Neg Hx         Health Maintenance Due   Topic Date Due   • COVID-19 Vaccine (1) Never done   • Pneumococcal Vaccine 0-64 (1 - PCV) Never done   • HEPATITIS C SCREENING  Never done   • PAP SMEAR  Never done   • INFLUENZA VACCINE  Never done        Last Completed Pap Smear     This patient has no relevant Health Maintenance data.          Last Completed Mammogram     This patient has no relevant Health Maintenance data.          Last Completed Colonoscopy     This patient has no relevant Health Maintenance data.          Current Outpatient Medications on File Prior to Visit   Medication Sig   • doxycycline (MONODOX) 100 MG capsule Take 1 capsule by mouth 2 (Two) Times a Day for 5 days.   • methylPREDNISolone (MEDROL) 4 MG dose pack Take as directed on package instructions.   • guaiFENesin (Mucinex) 600 MG 12 hr tablet Take 1 tablet by mouth 2 (Two) Times a Day for 14 days.     No current facility-administered medications on file prior to visit.         There is no immunization history on file for this patient.    Review of Systems     Objective     Pulse 81   Temp 97.3 °F (36.3 °C)   Wt (!) 137 kg (303 lb)   SpO2 100%   BMI 52.01 kg/m²         Physical Exam  Vitals and nursing note reviewed.   Constitutional:       General: She is not in acute distress.  HENT:      Head: Normocephalic.      Mouth/Throat:      Mouth: Mucous membranes are moist.      Pharynx: No posterior oropharyngeal erythema.   Cardiovascular:      Rate and Rhythm: Normal rate and regular rhythm.      Heart sounds: Normal heart sounds.   Pulmonary:      Effort: Pulmonary effort is normal.      Breath sounds: Normal breath sounds.   Musculoskeletal:      Cervical back: Tenderness present.   Lymphadenopathy:      Cervical: Cervical adenopathy present.   Skin:     General: Skin is warm and dry.   Neurological:      Mental Status: She is alert.    Psychiatric:         Mood and Affect: Mood normal.           Result Review :                           Assessment and Plan        Diagnoses and all orders for this visit:    1. Sore throat (Primary)  -     azithromycin (Zithromax Z-Aleks) 250 MG tablet; Take 2 tablets by mouth on day 1, then 1 tablet daily on days 2-5  Dispense: 6 tablet; Refill: 0    2. Adenopathy, cervical              Follow Up     She reports that she has tolerated Zithromax in the past without difficulty.  She suggested that she typically requires 2 prescriptions for resolution of symptoms.    Since she does not have a red throat today, it does not appear that she needs extended antibiotic treatment.  Since that she has only had 2 days of doxycycline, will continue current treatment with Zithromax..    Advised to continue with the methylprednisolone until gone.  Advised to drink warm fluids, and over-the-counter medication as needed for comfort.      Return if symptoms worsen or fail to improve.    Patient was given instructions and counseling regarding her condition or for health maintenance advice. Please see specific information pulled into the AVS if appropriate.

## 2022-12-08 ENCOUNTER — OFFICE VISIT (OUTPATIENT)
Dept: FAMILY MEDICINE CLINIC | Facility: CLINIC | Age: 42
End: 2022-12-08

## 2022-12-08 VITALS — WEIGHT: 293 LBS | TEMPERATURE: 97.5 F | HEART RATE: 83 BPM | BODY MASS INDEX: 51.67 KG/M2 | OXYGEN SATURATION: 99 %

## 2022-12-08 DIAGNOSIS — N76.0 BACTERIAL VAGINOSIS: ICD-10-CM

## 2022-12-08 DIAGNOSIS — B96.89 BACTERIAL VAGINOSIS: ICD-10-CM

## 2022-12-08 DIAGNOSIS — N94.9 VAGINAL BURNING: ICD-10-CM

## 2022-12-08 DIAGNOSIS — J30.9 ALLERGIC RHINITIS, UNSPECIFIED SEASONALITY, UNSPECIFIED TRIGGER: ICD-10-CM

## 2022-12-08 DIAGNOSIS — R30.0 BURNING WITH URINATION: Primary | ICD-10-CM

## 2022-12-08 LAB
BILIRUB BLD-MCNC: NEGATIVE MG/DL
CANDIDA SPECIES: POSITIVE
CLARITY, POC: CLEAR
COLOR UR: YELLOW
EXPIRATION DATE: ABNORMAL
GARDNERELLA VAGINALIS: POSITIVE
GLUCOSE UR STRIP-MCNC: NEGATIVE MG/DL
KETONES UR QL: NEGATIVE
LEUKOCYTE EST, POC: ABNORMAL
Lab: ABNORMAL
NITRITE UR-MCNC: NEGATIVE MG/ML
PH UR: 6.5 [PH]
PROT UR STRIP-MCNC: NEGATIVE MG/DL
RBC # UR STRIP: ABNORMAL /UL
SP GR UR: 1.02
T VAGINALIS DNA VAG QL PROBE+SIG AMP: NEGATIVE
UROBILINOGEN UR QL: ABNORMAL

## 2022-12-08 PROCEDURE — 87510 GARDNER VAG DNA DIR PROBE: CPT

## 2022-12-08 PROCEDURE — 87480 CANDIDA DNA DIR PROBE: CPT

## 2022-12-08 PROCEDURE — 99213 OFFICE O/P EST LOW 20 MIN: CPT

## 2022-12-08 PROCEDURE — 87660 TRICHOMONAS VAGIN DIR PROBE: CPT

## 2022-12-08 RX ORDER — METRONIDAZOLE 500 MG/1
500 TABLET ORAL 2 TIMES DAILY
Qty: 14 TABLET | Refills: 0 | Status: SHIPPED | OUTPATIENT
Start: 2022-12-08 | End: 2022-12-15

## 2022-12-08 RX ORDER — FLUTICASONE PROPIONATE 50 MCG
2 SPRAY, SUSPENSION (ML) NASAL DAILY
Qty: 16 G | Refills: 0 | Status: SHIPPED | OUTPATIENT
Start: 2022-12-08 | End: 2023-02-15

## 2022-12-08 NOTE — PROGRESS NOTES
Chief Complaint  Difficulty Urinating (Burning x3 days)    Subjective          Ashley Costa presents to [facility]  History of Present Illness    Ashley is here for difficulty urinating and burning with urination for three days.  She reports going to urgent care, [facility] and doctor's office frequently over the past couple weeks.  She said she feels like she just cannot kick her symptoms.  She says she still having residual face pain/congestion/ear pain.  But she said now she is having urination and burning sensation.  She reports she went to [facility] [place] yesterday and they told her that she needed to come here. Urine there showed white blood cells. She has increased her fluids and is drinking cranberry juice.  Burning is right after she urinates, she bought some new toilet paper and has some lavender she thinks she may be having some vaginal irritation possibly from the new toilet paper. She was given a pill for possible yeast infection yesterday at the urgent care.    Objective   Vital Signs:   Pulse 83   Temp 97.5 °F (36.4 °C)   Wt (!) 137 kg (301 lb)   SpO2 99%   BMI 51.67 kg/m²     Physical Exam  Vitals reviewed.   Constitutional:       Appearance: Normal appearance. She is well-developed.   HENT:      Head: Normocephalic and atraumatic.      Right Ear: A middle ear effusion is present. Tympanic membrane is erythematous and bulging.      Left Ear: A middle ear effusion is present. Tympanic membrane is erythematous and bulging.      Nose: Congestion present.      Mouth/Throat:      Pharynx: No oropharyngeal exudate.   Eyes:      Conjunctiva/sclera: Conjunctivae normal.      Pupils: Pupils are equal, round, and reactive to light.   Cardiovascular:      Rate and Rhythm: Normal rate and regular rhythm.      Heart sounds: No murmur heard.    No friction rub. No gallop.   Pulmonary:      Effort: Pulmonary effort is normal.      Breath sounds: Normal breath  sounds. No wheezing or rhonchi.   Skin:     General: Skin is warm and dry.   Neurological:      Mental Status: She is alert and oriented to person, place, and time.   Psychiatric:         Mood and Affect: Affect normal.        Result Review :          Procedures      Assessment and Plan    Diagnoses and all orders for this visit:    1. Burning with urination (Primary)  -     POCT urinalysis dipstick, automated  -     Gardnerella vaginalis, Trichomonas vaginalis, Candida albicans, DNA - Swab, Vagina    2. Vaginal burning  -     Gardnerella vaginalis, Trichomonas vaginalis, Candida albicans, DNA - Swab, Vagina    3. Allergic rhinitis, unspecified seasonality, unspecified trigger  -     fluticasone (Flonase) 50 MCG/ACT nasal spray; 2 sprays into the nostril(s) as directed by provider Daily.  Dispense: 16 g; Refill: 0              Follow Up   No follow-ups on file.  Patient was given instructions and counseling regarding her condition or for health maintenance advice. Please see specific information pulled into the AVS if appropriate.

## 2022-12-13 ENCOUNTER — TRANSCRIBE ORDERS (OUTPATIENT)
Dept: ADMINISTRATIVE | Facility: HOSPITAL | Age: 42
End: 2022-12-13

## 2022-12-13 DIAGNOSIS — Z12.31 VISIT FOR SCREENING MAMMOGRAM: Primary | ICD-10-CM

## 2023-02-15 ENCOUNTER — OFFICE VISIT (OUTPATIENT)
Dept: FAMILY MEDICINE CLINIC | Facility: CLINIC | Age: 43
End: 2023-02-15
Payer: COMMERCIAL

## 2023-02-15 VITALS
WEIGHT: 293 LBS | SYSTOLIC BLOOD PRESSURE: 152 MMHG | BODY MASS INDEX: 50.02 KG/M2 | OXYGEN SATURATION: 98 % | TEMPERATURE: 97.7 F | HEART RATE: 96 BPM | DIASTOLIC BLOOD PRESSURE: 88 MMHG | HEIGHT: 64 IN

## 2023-02-15 DIAGNOSIS — K59.00 CONSTIPATION, UNSPECIFIED CONSTIPATION TYPE: ICD-10-CM

## 2023-02-15 DIAGNOSIS — M54.6 THORACIC BACK PAIN, UNSPECIFIED BACK PAIN LATERALITY, UNSPECIFIED CHRONICITY: Primary | ICD-10-CM

## 2023-02-15 LAB
BILIRUB BLD-MCNC: NEGATIVE MG/DL
CLARITY, POC: CLEAR
COLOR UR: YELLOW
GLUCOSE UR STRIP-MCNC: NEGATIVE MG/DL
KETONES UR QL: NEGATIVE
LEUKOCYTE EST, POC: NEGATIVE
NITRITE UR-MCNC: NEGATIVE MG/ML
PH UR: 6.5 [PH] (ref 5–8)
PROT UR STRIP-MCNC: NEGATIVE MG/DL
RBC # UR STRIP: ABNORMAL /UL
SP GR UR: 1 (ref 1–1.03)
UROBILINOGEN UR QL: ABNORMAL

## 2023-02-15 PROCEDURE — 99213 OFFICE O/P EST LOW 20 MIN: CPT | Performed by: NURSE PRACTITIONER

## 2023-02-15 PROCEDURE — 96372 THER/PROPH/DIAG INJ SC/IM: CPT | Performed by: NURSE PRACTITIONER

## 2023-02-15 RX ORDER — KETOROLAC TROMETHAMINE 30 MG/ML
30 INJECTION, SOLUTION INTRAMUSCULAR; INTRAVENOUS ONCE
Status: COMPLETED | OUTPATIENT
Start: 2023-02-15 | End: 2023-02-15

## 2023-02-15 RX ORDER — POLYETHYLENE GLYCOL 3350 17 G/17G
17 POWDER, FOR SOLUTION ORAL DAILY
Qty: 30 EACH | Refills: 2 | Status: SHIPPED | OUTPATIENT
Start: 2023-02-15

## 2023-02-15 RX ORDER — METHYLPREDNISOLONE 4 MG/1
TABLET ORAL
Qty: 21 TABLET | Refills: 0 | Status: SHIPPED | OUTPATIENT
Start: 2023-02-15

## 2023-02-15 RX ADMIN — KETOROLAC TROMETHAMINE 30 MG: 30 INJECTION, SOLUTION INTRAMUSCULAR; INTRAVENOUS at 09:44

## 2023-02-15 RX ADMIN — KETOROLAC TROMETHAMINE 30 MG: 30 INJECTION, SOLUTION INTRAMUSCULAR; INTRAVENOUS at 09:45

## 2023-02-15 NOTE — PROGRESS NOTES
Chief Complaint  Back Pain (Pain in the middle of back and radiates out to the sides, started about 4-5 days ago, not sleeping due to the pain) and Earache (Left ear pain)    PHQ-2 Total Score: 0    Subjective        Past Medical History:   Diagnosis Date   • Allergic Age 3   • Asthma Since birth   • Diverticulosis    • Obesity    • Urinary tract infection 3-4-2022    Burning when peeing     Social History     Tobacco Use   • Smoking status: Every Day     Packs/day: 1.00     Years: 15.00     Pack years: 15.00     Types: Cigarettes     Start date: 1996   • Smokeless tobacco: Never   Vaping Use   • Vaping Use: Never used   Substance Use Topics   • Alcohol use: Never   • Drug use: Never      Current Outpatient Medications on File Prior to Visit   Medication Sig   • [DISCONTINUED] azithromycin (Zithromax Z-Aleks) 250 MG tablet Take 2 tablets by mouth on day 1, then 1 tablet daily on days 2-5   • [DISCONTINUED] fluticasone (Flonase) 50 MCG/ACT nasal spray 2 sprays into the nostril(s) as directed by provider Daily.   • [DISCONTINUED] methylPREDNISolone (MEDROL) 4 MG dose pack Take as directed on package instructions.     No current facility-administered medications on file prior to visit.      Allergies   Allergen Reactions   • Penicillin G Anaphylaxis      Health Maintenance Due   Topic Date Due   • COVID-19 Vaccine (1) Never done   • Pneumococcal Vaccine 0-64 (1 - PCV) Never done   • HEPATITIS C SCREENING  Never done   • ANNUAL PHYSICAL  Never done   • PAP SMEAR  Never done   • INFLUENZA VACCINE  Never done      Thanhjarred Costa presents to [facility]  History of Present Illness  Here with back pain x 4-5day and in the middle back and radiates out to the sides. Tried Tylenol without relief. Tried ice and ibuprofen. Hot showers and baths. Tried a foam roller. No known injury or change in activity. Pain is keeping her from sleeping. Pt has lipomas of the back and was told in the past they  "were in her nerves. Denies pain or burning with urination or vaginal discharge. Pt currently menstruating, large blood in urine.     Pt also notes constipation.       Objective   Vital Signs:   /88 (BP Location: Left arm)   Pulse 96   Temp 97.7 °F (36.5 °C)   Ht 162.6 cm (64\")   Wt (!) 137 kg (301 lb)   SpO2 98%   BMI 51.67 kg/m²     Review of Systems   Physical Exam  Vitals reviewed.   Constitutional:       General: She is not in acute distress.     Appearance: Normal appearance. She is well-developed.   Eyes:      Conjunctiva/sclera: Conjunctivae normal.      Pupils: Pupils are equal, round, and reactive to light.   Cardiovascular:      Rate and Rhythm: Normal rate and regular rhythm.      Heart sounds: Normal heart sounds.   Pulmonary:      Effort: Pulmonary effort is normal.      Breath sounds: Normal breath sounds.   Musculoskeletal:      Thoracic back: Tenderness present.        Back:    Skin:     General: Skin is warm and dry.   Neurological:      Mental Status: She is alert and oriented to person, place, and time.   Psychiatric:         Mood and Affect: Mood and affect normal.         Behavior: Behavior normal.         Thought Content: Thought content normal.         Judgment: Judgment normal.        Result Review :            Assessment and Plan    Diagnoses and all orders for this visit:    1. Thoracic back pain, unspecified back pain laterality, unspecified chronicity (Primary)  -     POCT urinalysis dipstick, manual  -     methylPREDNISolone (MEDROL) 4 MG dose pack; Take as directed on package instructions.  Dispense: 21 tablet; Refill: 0  -     ketorolac (TORADOL) injection 30 mg  -     ketorolac (TORADOL) injection 30 mg    2. Constipation, unspecified constipation type  -     polyethylene glycol (MIRALAX) 17 g packet; Take 17 g by mouth Daily.  Dispense: 30 each; Refill: 2        Follow Up   Return in about 1 week (around 2/22/2023) for Recheck.  Patient was given instructions and " counseling regarding her condition or for health maintenance advice. Please see specific information pulled into the AVS if appropriate.

## 2023-03-23 ENCOUNTER — OFFICE VISIT (OUTPATIENT)
Dept: FAMILY MEDICINE CLINIC | Facility: CLINIC | Age: 43
End: 2023-03-23
Payer: COMMERCIAL

## 2023-03-23 VITALS
OXYGEN SATURATION: 98 % | BODY MASS INDEX: 50.02 KG/M2 | SYSTOLIC BLOOD PRESSURE: 150 MMHG | HEART RATE: 88 BPM | DIASTOLIC BLOOD PRESSURE: 82 MMHG | HEIGHT: 64 IN | WEIGHT: 293 LBS | TEMPERATURE: 97.7 F

## 2023-03-23 DIAGNOSIS — R73.03 PREDIABETES: ICD-10-CM

## 2023-03-23 DIAGNOSIS — N39.0 URINARY TRACT INFECTION WITHOUT HEMATURIA, SITE UNSPECIFIED: Primary | ICD-10-CM

## 2023-03-23 DIAGNOSIS — R31.29 OTHER MICROSCOPIC HEMATURIA: ICD-10-CM

## 2023-03-23 DIAGNOSIS — J01.01 ACUTE RECURRENT MAXILLARY SINUSITIS: ICD-10-CM

## 2023-03-23 LAB
BILIRUB BLD-MCNC: NEGATIVE MG/DL
CLARITY, POC: ABNORMAL
COLOR UR: ABNORMAL
EXPIRATION DATE: ABNORMAL
GLUCOSE UR STRIP-MCNC: NEGATIVE MG/DL
KETONES UR QL: NEGATIVE
LEUKOCYTE EST, POC: NEGATIVE
Lab: ABNORMAL
NITRITE UR-MCNC: NEGATIVE MG/ML
PH UR: 6 [PH] (ref 5–8)
PROT UR STRIP-MCNC: NEGATIVE MG/DL
RBC # UR STRIP: ABNORMAL /UL
SP GR UR: 1.01 (ref 1–1.03)
UROBILINOGEN UR QL: ABNORMAL

## 2023-03-23 PROCEDURE — 87086 URINE CULTURE/COLONY COUNT: CPT | Performed by: NURSE PRACTITIONER

## 2023-03-23 RX ORDER — CEPHALEXIN 500 MG/1
500 CAPSULE ORAL 2 TIMES DAILY
Qty: 20 CAPSULE | Refills: 0 | Status: SHIPPED | OUTPATIENT
Start: 2023-03-23

## 2023-03-23 NOTE — PROGRESS NOTES
Chief Complaint  Urinary Tract Infection (Patient is here for a possible UTI that started yesterday.  She has been battling her sinus pressure and pain for 2 weeks. )    Subjective        Past Medical History:   Diagnosis Date   • Allergic Age 3   • Asthma Since birth   • Diverticulosis    • Obesity    • Urinary tract infection 3-4-2022    Burning when peeing     Social History     Tobacco Use   • Smoking status: Every Day     Packs/day: 1.00     Years: 15.00     Pack years: 15.00     Types: Cigarettes     Start date: 1996   • Smokeless tobacco: Never   Vaping Use   • Vaping Use: Never used   Substance Use Topics   • Alcohol use: Never   • Drug use: Never      Current Outpatient Medications on File Prior to Visit   Medication Sig   • polyethylene glycol (MIRALAX) 17 g packet Take 17 g by mouth Daily.   • methylPREDNISolone (MEDROL) 4 MG dose pack Take as directed on package instructions. (Patient not taking: Reported on 3/23/2023)     No current facility-administered medications on file prior to visit.      Allergies   Allergen Reactions   • Penicillin G Anaphylaxis      Health Maintenance Due   Topic Date Due   • COVID-19 Vaccine (1) Never done   • Pneumococcal Vaccine 0-64 (1 - PCV) Never done   • HEPATITIS C SCREENING  Never done   • ANNUAL PHYSICAL  Never done   • PAP SMEAR  Never done   • INFLUENZA VACCINE  Never done      Marrijarred Costa presents to [facility]  History of Present Illness  Here with UTI symptoms since yesterday. Pt states yesterday she was having frequency especially with drinking fluids. Pt notes waking every 1-2 hours through the night to void. Irritation with wiping. Pt did have some low back pain but states she has a hx of sciatica.   Denies nausea, vomiting, diarrhea. She has been drinking cranberry juice. Pt states that she drinks a lot of tea, no soda (soda causes headache).     Constant sinus drainage with left ear pain. Symptoms started 2 weeks ago  "with green sinus drainage and then dried blood in the nostrils.     Notes will have purple coloring to the left elbow with a bent arm and notes spasms in the left shoulder area at times. Pt has hx of neck issues.       Objective   Vital Signs:   /82 (BP Location: Left arm, Patient Position: Sitting, Cuff Size: Adult)   Pulse 88   Temp 97.7 °F (36.5 °C) (Temporal)   Ht 162.6 cm (64\")   Wt 136 kg (300 lb)   SpO2 98%   BMI 51.49 kg/m²     Review of Systems   Physical Exam  Vitals reviewed.   Constitutional:       General: She is not in acute distress.     Appearance: Normal appearance. She is well-developed.   HENT:      Head: Normocephalic and atraumatic.      Right Ear: Tympanic membrane, ear canal and external ear normal.      Left Ear: Tympanic membrane and external ear normal.      Ears:      Comments: Erythema and dryness of the left ear canal noted  Eyes:      Conjunctiva/sclera: Conjunctivae normal.      Pupils: Pupils are equal, round, and reactive to light.   Cardiovascular:      Rate and Rhythm: Normal rate and regular rhythm.      Heart sounds: Normal heart sounds.   Pulmonary:      Effort: Pulmonary effort is normal.      Breath sounds: Normal breath sounds.   Musculoskeletal:      Cervical back: Neck supple.   Skin:     General: Skin is warm and dry.   Neurological:      Mental Status: She is alert and oriented to person, place, and time.   Psychiatric:         Mood and Affect: Mood and affect normal.         Behavior: Behavior normal.         Thought Content: Thought content normal.         Judgment: Judgment normal.        Result Review :   The following data was reviewed by: FANTA Hilton on 03/23/2023:  UA    Urinalysis 12/8/22 2/15/23 3/23/23   Ketones, UA Negative Negative Negative   Leukocytes, UA Small (1+) (A) Negative Negative   (A) Abnormal value                      Assessment and Plan    Diagnoses and all orders for this visit:    1. Urinary tract infection without " hematuria, site unspecified (Primary)  -     POCT urinalysis dipstick, automated  -     cephalexin (Keflex) 500 MG capsule; Take 1 capsule by mouth 2 (Two) Times a Day.  Dispense: 20 capsule; Refill: 0    2. Other microscopic hematuria  -     Urine Culture - Urine, Urine, Random Void    3. Acute recurrent maxillary sinusitis  -     cephalexin (Keflex) 500 MG capsule; Take 1 capsule by mouth 2 (Two) Times a Day.  Dispense: 20 capsule; Refill: 0    4. Prediabetes    Discussed with patient A1c from outlying lab with A1c of 6.0 and discussed with patient her prediabetes and recommendations to reduce carbohydrates in diet.  Switch to a diet sweet tea to reduce carbohydrate intake.    Follow Up   Return if symptoms worsen or fail to improve.  Patient was given instructions and counseling regarding her condition or for health maintenance advice. Please see specific information pulled into the AVS if appropriate.

## 2023-03-24 LAB — BACTERIA SPEC AEROBE CULT: NORMAL

## 2023-06-05 ENCOUNTER — TELEPHONE (OUTPATIENT)
Dept: PEDIATRICS | Facility: OTHER | Age: 43
End: 2023-06-05

## 2023-06-05 NOTE — TELEPHONE ENCOUNTER
Caller: Ashley Costa    Relationship to patient: Self    Best call back number: [phone]     Chief complaint: BACK PAIN, SPASM    Type of visit: SAME DAY/INJECTION    Requested date: TODAY    Additional notes:PATIENT IS REQUESTING A TORODOL INJECTION.

## 2023-06-06 ENCOUNTER — OFFICE VISIT (OUTPATIENT)
Dept: FAMILY MEDICINE CLINIC | Facility: CLINIC | Age: 43
End: 2023-06-06
Payer: COMMERCIAL

## 2023-06-06 VITALS
TEMPERATURE: 97.5 F | HEIGHT: 64 IN | BODY MASS INDEX: 50.02 KG/M2 | WEIGHT: 293 LBS | HEART RATE: 90 BPM | OXYGEN SATURATION: 99 %

## 2023-06-06 DIAGNOSIS — M54.9 MECHANICAL BACK PAIN: Primary | ICD-10-CM

## 2023-06-06 PROBLEM — Z72.0 TOBACCO USE: Status: ACTIVE | Noted: 2022-12-13

## 2023-06-06 PROBLEM — Z98.51 S/P TUBAL LIGATION: Status: ACTIVE | Noted: 2022-12-13

## 2023-06-06 PROBLEM — N92.6 IRREGULAR PERIODS: Status: ACTIVE | Noted: 2022-12-13

## 2023-06-06 PROBLEM — J45.909 UNSPECIFIED ASTHMA, UNCOMPLICATED: Status: ACTIVE | Noted: 2021-06-09

## 2023-06-06 RX ORDER — KETOROLAC TROMETHAMINE 10 MG/1
10 TABLET, FILM COATED ORAL EVERY 6 HOURS PRN
Qty: 12 TABLET | Refills: 0 | Status: SHIPPED | OUTPATIENT
Start: 2023-06-06 | End: 2023-06-09

## 2023-06-06 RX ORDER — KETOROLAC TROMETHAMINE 30 MG/ML
30 INJECTION, SOLUTION INTRAMUSCULAR; INTRAVENOUS ONCE
Status: COMPLETED | OUTPATIENT
Start: 2023-06-06 | End: 2023-06-06

## 2023-06-06 RX ADMIN — KETOROLAC TROMETHAMINE 30 MG: 30 INJECTION, SOLUTION INTRAMUSCULAR; INTRAVENOUS at 09:10

## 2023-06-06 RX ADMIN — KETOROLAC TROMETHAMINE 30 MG: 30 INJECTION, SOLUTION INTRAMUSCULAR; INTRAVENOUS at 09:09

## 2023-06-06 NOTE — PROGRESS NOTES
"Chief Complaint    Back Pain (Mid back pain x 6 days.  Has tried heat and Ibuprofen.)    Subjective      Ashley Costa presents to [facility]    History of Present Illness    1.) BACK PAIN : Acute on chronic.  Patient presents with complaint of lower thoracic back pain.  She also indicates areas of her lower ribs as being painful (bilateral, left > right).  Unable to determine quality of pain.  No lower extremity symptoms.  Patient notes that she has been utilizing ibuprofen, which provides some symptomatic relief.  She notes worsening of her symptoms with activity.    Objective     Vital Signs:     Pulse 90   Temp 97.5 °F (36.4 °C) (Temporal)   Ht 162.6 cm (64\")   Wt (!) 141 kg (310 lb 9.6 oz)   SpO2 99%   BMI 53.31 kg/m²       Physical Exam  Vitals reviewed.   Constitutional:       General: She is not in acute distress.     Appearance: Normal appearance. She is well-developed.   HENT:      Head: Normocephalic and atraumatic.      Right Ear: Hearing and external ear normal.      Left Ear: Hearing and external ear normal.      Nose: Nose normal.   Eyes:      General: Lids are normal.         Right eye: No discharge.         Left eye: No discharge.      Conjunctiva/sclera: Conjunctivae normal.   Pulmonary:      Effort: Pulmonary effort is normal.   Abdominal:      General: There is no distension.   Musculoskeletal:         General: No swelling.        Arms:       Cervical back: Neck supple.      Comments: Patient designated area of discomfort. Mild tenderness with palpation of musculature.   Skin:     Coloration: Skin is not jaundiced.      Findings: No erythema.   Neurological:      Mental Status: She is alert. Mental status is at baseline.   Psychiatric:         Mood and Affect: Mood and affect normal.         Thought Content: Thought content normal.   Assessment and Plan     Diagnoses and all orders for this visit:    1. Mechanical back pain (Primary)  Comments:  IM NSAID " as noted. Start PO NSAID on 6.7.23. Relative rest. Activity as tolerated. Follow up as needed.  Orders:  -     ketorolac (TORADOL) injection 30 mg  -     ketorolac (TORADOL) injection 30 mg    Other orders  -     ketorolac (TORADOL) 10 MG tablet; Take 1 tablet by mouth Every 6 (Six) Hours As Needed for Moderate Pain for up to 3 days.  Dispense: 12 tablet; Refill: 0      Patient was given instructions and counseling regarding her condition or for health maintenance advice. Please see specific information pulled into the AVS if appropriate.

## 2023-06-10 ENCOUNTER — E-VISIT (OUTPATIENT)
Dept: FAMILY MEDICINE CLINIC | Facility: TELEHEALTH | Age: 43
End: 2023-06-10
Payer: COMMERCIAL

## 2023-06-10 NOTE — E-VISIT TREATED
Chief Complaint: Ear pain   Patient introduction   Patient is 42-year-old female who has throbbing pain in their right ear.   General presentation   Patient first noticed symptoms 4 to 5 days ago. They came on gradually. Symptoms are always there. Patient rates their pain as moderate (4 to 6/10). No fever.   No symptoms of tinnitus. No hearing loss. No drainage.   No history of PE tubes.   Patient's outer ear is painful to the touch.   Patient also has:    Respiratory symptoms, including stuffy nose and sinus pain or pressure.    Nasal/sinus symptoms for 6 to 9 days. Patient's nasal/sinus symptoms have not improved.    Headache described as mild/moderate.   No recent airplane travel, scuba diving, hiking or driving in the mountains, or exposure to a loud blast or explosion. No swimming or water in ears in the last few weeks. No recent exposure to tobacco smoke, smoke from a fire or wood-burning stove, or air pollution. No regular use of hearing aids, earbuds or in-ear headphones, swim caps, cotton swabs, or ear canal irrigation kits.   Patient has used the following OTC medication(s) for their ear symptoms: acetaminophen, fluticasone, and ibuprofen.   Review of alarm symptoms/red flags:    No difficulty moving their chin toward their chest    No current treatment by ENT    No current PE tubes in affected ear(s)    No mastoid or ear surgery in the last 5 years    No mastoid redness, warmth, or pain    No sharp or pointed object in ear canal    No foreign body in ear    No recent head trauma    No vision changes    No hearing loss in both ears   Pregnancy/menstrual status/breastfeeding:   Not pregnant. Not breastfeeding. Regarding date of last menstrual period, patient writes: Now.   Self-exam:    No difficulty moving their chin toward their chest    No mastoid redness, warmth, or pain    Pain is unchanged by chewing or moving the jaw    Pain is exacerbated by manipulation of the pinna and/or pressure on the  tragus    Pain is worse when lying down   Current medications   Not taking other medications or supplements.   Medication allergies    Penicillins (reaction: raised, red, itchy spots with each spot lasting less than 24 hours and swelling of the mouth, eyes, lips, or tongue)   Medication contraindication review   No history of arrhythmia, congestive heart failure, recent myocardial infarction, heart block, heart disease, hypertension, hyperthyroidism, mononucleosis, or myasthenia gravis.   No known history of amoxicillin-clavulanate-associated jaundice or hepatic impairment.   No known history of azithromycin-associated cholestatic jaundice or hepatic impairment.   No history of aspirin triad; NSAID-induced asthma/urticaria; bowel obstruction; coronary artery disease; coagulation disorder; urinary retention; electrolyte abnormalities; G6PD deficiency; GI bleeding; hypertension; influenza, varicella, or febrile viral infection; or CABG surgery.   Past medical history   Immune conditions: No immunocompromising conditions.   No history of cancer.   Patient-submitted comments   Patient was asked if they had anything to add about their symptoms. Patient writes: Under my right ear by jaw is pain to.   Patient did not request an excuse note.   Assessment   Otitis media.   This is the likely diagnosis based on patient's interview responses, including:    Symptom profile    Symptoms worse when lying down    Recent cold or allergy symptoms   Plan   Medications:    doxycycline monohydrate 100 mg tablet RX 100mg 1 tab PO bid 5d for infection. This medication is an antibiotic. Take it exactly as directed. You must finish the entire course of medication, even if you feel better after the first few days of treatment. Amount is 10 tab.   The patient's prescription will be sent to:   [facility]   [address] [place] [address]   Phone: [phone]     Fax: [phone]   Education:    Condition and causes     Prevention    Treatment and self-care    When to call provider   ----------   Electronically signed by FANTA Duckworth on 2023-06-10 at 13:09PM   ----------   Patient Interview Transcript:   How would you describe your ear pain or discomfort? Select all that apply.    Throbbing   Not selected:    Achy    Itchy    Blocked or plugged    Full    Pressure    Crackling or popping    Shooting/stabbing/sharp   On a scale of 1 to 10, how severe is your ear pain? Select one.    Moderate (4 to 6); it's pretty uncomfortable   Not selected:    Mild (1 to 3); it bothers me a little bit    Moderate to severe (7 to 9); it's intense    Very severe; it's unbearable (10+)   Which ear is affected? Select one.    My right ear   Not selected:    My left ear    Both of my ears   When did you first notice this ear pain or discomfort? Select one.    4 to 5 days ago   Not selected:    Today    Yesterday    2 to 3 days ago    More than 5 days ago   Did your ear pain or discomfort come on suddenly or over time? Select one.    Over time   Not selected:    Suddenly   Is the outside of the affected ear red, swollen, warm to the touch, or painful to the touch? Select all that apply.    Painful to the touch   Not selected:    Red    Swollen    Warm to the touch    None of these   Is your ear pain or discomfort always there, or does it come and go? Select one.    Always there   Not selected:    Comes and goes   Does the pain or discomfort get worse when you bite or chew? Select one.    No   Not selected:    Yes   Along with your ear symptoms, have you had a fever or felt hot? Select one.    No   Not selected:    Yes   Do you have any of these on the same side as your affected ear?    None of the above   Not selected:    Pain or tenderness over the bone behind your ear    Redness over the bone behind your ear    Warmth over the bone behind your ear   Do your symptoms include the sudden onset of ringing in one or both ears? Select one.    No   Not  selected:    Yes   Do your symptoms include hearing loss? Select one.    No   Not selected:    Yes, I can't hear as well as I usually do    Yes, I can't hear at all in either ear   Has there been fluid draining out of either ear? Select one.    No   Not selected:    Yes, but no more than usual    Yes, and this is new or more than the usual amount   Along with your ear symptoms, have you had any of these cold symptoms? Select all that apply.    Stuffy nose (nasal congestion)    Sinus pain or pressure   Not selected:    Runny nose    Postnasal drip    Cough    Scratchy or sore throat    None of the above   How long have you had these nasal or sinus symptoms? Select one.    6 to 9 days   Not selected:    Less than 48 hours    3 to 5 days    10 to 14 days    2 to 4 weeks    1 month or longer   Have your nasal or sinus symptoms improved at all since they began? Select one.    No   Not selected:    Yes, but they haven't gone away completely    Yes, but then they came back worse than before   Along with your ear symptoms, do you have any of these throat or digestion symptoms? Select all that apply.    None of these   Not selected:    Burning feeling in your chest (heartburn)    Swallowed food or liquids getting stuck and coming back up    Feeling like there's a lump in your throat    Hoarse voice    Difficulty swallowing   Along with your ear symptoms, have you had a headache? Select one.    Yes   Not selected:    No   How would you describe your headache? Select one.    Mild to moderate   Not selected:    Severe    Unbearable    The worst headache of my life   Have you had any of these vision changes? Select all that apply.    None of these   Not selected:    Blurry vision    Double vision    I can't see at all from one or both eyes   Along with your ear symptoms, have you felt dizzy or had vertigo? This includes feeling like you're spinning, swaying, or tilting; feeling light-headed; or feeling like the room is moving  around you. Select one.    No   Not selected:    Yes   Do your symptoms include vomiting? Select one.    No   Not selected:    Yes   Can you move your chin toward your chest? Select one.    Yes   Not selected:    No, my neck is too stiff   Does your ear pain or discomfort get worse if you do either of these: 1. Pull the cartilage part of your ear up and back 2. Push on your tragus (the flesh in front of your ear opening)    Yes   Not selected:    No   Take a moment and lie down. Does your ear pain or discomfort feel worse when you lie down? Select one.    Yes   Not selected:    No   Right before your symptoms started, did you put anything sharp or pointed into your ear canal? Select one.    No   Not selected:    Yes   Is it possible that you have something stuck in your ear canal? Examples include a bead, button, rock, or part of an earbud. Select one.    No   Not selected:    Yes   Right before your ear pain began, did you have a severe head or ear injury? Examples include: - A blow to your head or ear - Hitting your head or ear on a hard surface - Falling on your ear while skateboarding or skiing - A motor vehicle accident - A sports injury, such as in wrestling - Penetrating trauma, such as a knife wound Select one.    No   Not selected:    Yes   In the week before your symptoms started, did any of these apply to you? Select all that apply.    None of the above   Not selected:    Air travel    Scuba diving    Hiking or driving in the mountains    Exposed to a loud blast or explosion   In the few weeks before your symptoms started, did you go swimming or get water in one or both ears? Select one.    No   Not selected:    Yes   Have you recently been exposed to more smoke or air pollution than usual? Select all that apply.    None of the above   Not selected:    Yes, tobacco smoke    Yes, smoke from a fire or wood-burning stove    Yes, air pollution   Do you regularly use any of these items? Select all that  apply.    None of the above   Not selected:    Hearing aids    Earbuds or in-ear headphones    Swim caps    Cotton swabs to clean your ears    Earwax removal devices, such as an irrigation kit   Are you being treated by an Ear, Nose and Throat (ENT) doctor for an ear condition? An Ear, Nose, and Throat doctor is also known as an otolaryngologist. Select one.    No   Not selected:    Yes   Do you have ear tubes? Some other names for ear tubes are tympanostomy tubes, ventilation tubes, or pressure equalization (PE) tubes. Select one.    No   Not selected:    Yes, in my left ear only    Yes, in my right ear only    Yes, in both ears    No, but I've had them in the past   In the past 5 years, have you had mastoid surgery or ear surgery (not including ear tube placement or removal)? Select one.    No   Not selected:    Yes   When was the last time you were treated with antibiotics for an ear infection? This includes both oral antibiotics and antibiotic ear drops. Select one.    I'm not sure   Not selected:    Never    Within the last month    1 to 3 months ago    4 months to a year ago    More than a year ago   In the past year, how many times have you taken oral antibiotics for an ear infection? Select one.    I'm not sure   Not selected:    1    2 or more   Do you have any of these conditions that can affect the immune system? Scroll to see all options. Select all that apply.    None of these   Not selected:    History of bone marrow transplant    Chronic kidney disease    Chronic liver disease (including cirrhosis)    HIV/AIDS    Inflammatory bowel disease (Crohn's disease or ulcerative colitis)    Lupus    Moderate to severe plaque psoriasis    Multiple sclerosis    Rheumatoid arthritis    Sickle cell anemia    Alpha or beta thalassemia    History of solid organ transplant (kidney, liver, or heart)    History of spleen removal    An autoimmune disorder not listed here (specify)    A condition requiring treatment with  long-term use of oral steroids (such as prednisone, prednisolone, or dexamethasone) (specify)   Have you ever been diagnosed with cancer? Select one.    No   Not selected:    Yes, I have cancer now    Yes, but I'm in remission   Are you pregnant? Select one.    No   Not selected:    Yes   When was your last menstrual period? If you don't currently have periods or no longer have periods, please briefly explain.    Now   Are you breastfeeding? Select one.    No   Not selected:    Yes   The next few questions help us recommend the best treatment for you. Have you used any of these non-prescription medications for your ear symptoms? Select all that apply.    Acetaminophen (Tylenol)    Fluticasone (Flonase)    Ibuprofen (Advil, Motrin, Midol)   Not selected:    Carbamide peroxide otic (Auro, Debrox)    Cetirizine (Zyrtec)    Diphenhydramine (Benadryl)    Fexofenadine (Allegra)    Naproxen (Aleve)    Isopropyl alcohol in glycerin ear drops (Swim Ear drops)    Loratadine (Alavert, Claritin)    Oxymetazoline (Afrin)    Phenylephrine (Sudafed PE)    Pseudoephedrine (Sudafed)    Triamcinolone (Nasacort)    None of these   Are you taking any other medications, vitamins, or supplements? Select one.    No   Not selected:    Yes   Have you ever had an allergic or bad reaction to any medication? Select one.    Yes   Not selected:    No   Have you had an allergic or bad reaction to any of these medications? Select all that apply.    Penicillin, amoxicillin, ampicillin, dicloxacillin, nafcillin, or piperacillin (Augmentin, Unasyn, Zosyn)   Not selected:    Cephalexin, cefazolin, cefdinir, cefuroxime, ceftriaxone, ceftazidime, or cefepime (Ancef, Ceftin, Fortaz, Keflex, Maxipime, Rocephin)    Corticosteroid medications, such as betamethasone, budesonide, dexamethasone, fluticasone, flunisolide, hydrocortisone, methylprednisolone, mometasone, prednisone, or prednisolone, triamcinolone (AeroBid, Advair, Aerospan, Asmanex, Flovent,  "Flonase, Nasocort, Pulmicort)    Cyclobenzaprine (Flexeril)    Ciprofloxacin, levofloxacin, moxifloxacin, or ofloxacin (Cipro, Floxin, Levaquin)    Azithromycin, clarithromycin, or erythromycin (Biaxin, Erythrocin, Pediazole, Zithromax)    Naproxen (Aleve)    Doxycycline, minocycline, or tetracycline (Declomycin, Dynacin, Panmycin)    Fluconazole, itraconazole, or terconazole (Diflucan, Sporanox, Terazol)    None of these   When you had an allergic or bad reaction to penicillin or another \"-cillin\" antibiotic, did you have any of these symptoms? Select all that apply.    Raised, red, itchy spots with each spot lasting less than 24 hours    Swelling of the mouth, eyes, lips, or tongue   Not selected:    Blisters or ulcers on the lips, mouth, eyes, or vagina    Peeling skin    Breathing difficulties    Feeling light-headed or faint    A fast heartbeat    Clammy skin    Confusion and anxiety    Collapsing or losing consciousness    Joint pains    Problems with kidneys, lungs, or liver    None of the above   Have you had an allergic or bad reaction to benzonatate (Tessalon Perles)? Select one.    No   Not selected:    Yes   Have you had an allergic or bad reaction to any of these non-prescription medications? Scroll to see all options. Select all that apply.    None of these   Not selected:    Acetaminophen (Tylenol)    Aspirin    Carbamide peroxide (Auro, Debrox)    Cetirizine (Zyrtec)    Chlorpheniramine (Aller-chlor, Chlor-Trimeton)    Dextromethorphan (Delsym, Robitussin 12 Hour Cough Relief)    Diphenhydramine (Benadryl)    Fexofenadine (Allegra)    Guaifenesin (Mucinex)    Guaifenesin/dextromethorphan (Mucinex DM, Robitussin DM)    Ibuprofen (Advil, Motrin, Midol)    Isopropyl alcohol in glycerin ear drops (Swim Ear drops)    Loratadine (Alavert, Claritin)    Omeprazole (Prilosec)    Oxymetazoline (Afrin, Zicam Extreme Congestion Relief)    Pantoprazole (Protonix)    Phenylephrine (Suphedrine PE, Sudafed PE, " Wal-phed PE)    Pseudoephedrine (Sudafed, SudoGest, Wal-Phed D)   Are you currently being treated for any of these conditions? Select all that apply.    None of the above   Not selected:    Arrhythmia    Congestive heart failure    Recent heart attack    Heart block    Blockage or narrowing of the blood vessels of the heart    Hypertension    Hyperthyroidism    Mononucleosis    Myasthenia gravis   Amoxicillin-clavulanate (Augmentin)    No   Not selected:    Jaundice    Liver problems   Azithromycin (Zithromax, Zmax)    No   Not selected:    Jaundice    Liver problems   Do you have any of these conditions? Select all that apply.    None of the above   Not selected:    Aspirin triad (also known as Samter's triad or ASA triad)    Asthma or hives from taking aspirin or other NSAIDs, such as ibuprofen or naproxen    Bowel obstruction    Coagulation disorder    Difficulty urinating or completely emptying your bladder    Electrolyte abnormalities    G6PD deficiency    Gastrointestinal (GI) bleeding    Influenza, chickenpox, or a viral infection with fever    Recent coronary artery bypass graft (CABG) surgery   Have you taken any monoamine oxidase inhibitor (MAOI) medications within the last 14 days? Examples include rasagiline (Azilect), selegiline (Eldepryl, Zelapar), isocarboxazid (Marplan), phenelzine (Nardil), and tranylcypromine (Parnate). Select one.    No   Not selected:    Yes   Do you need a doctor's note? A doctor's note confirms that you received care today and states when you can return to school or work. It does not contain information about your diagnosis or treatment plan. Your provider will make the final decision on whether to give you a doctor's note. Doctor's notes CANNOT be backdated. Select one.    No   Not selected:    Today only (1 day)    Today and tomorrow (2 days)    3 days   Is there anything you'd like to add about your symptoms? Please limit your comments to the symptoms asked about in this  interview. If you include comments about other concerns, your provider may recommend that you be seen in person.    Under my right ear by jaw is pain to   ----------   Medical history   The following information was received from the EMR on Svitlana 10, 2023.   Allergies:    PENICILLIN G   - Allergy Type: Medication   - Reaction: Anaphylaxis   - Severity: Severe   - Clinical Status: Active   - Verification Status: Confirmed   Medications:    polyethylene glycol packet 17 gram - for daily dosing   - Route: Oral   - Start Date: February 15, 2023   - End Date: None   - Status: Active   Problem list:    Sciatica   - Category: Problem List Item   - Health Status:   - Start Date: April 07, 2022   - End Date: None   - Status: Active    Prediabetes   - Category: Problem List Item   - Health Status:   - Start Date: March 23, 2023   - End Date: None   - Status: Active    BMI 50.0-59.9, adult   - Category: Problem List Item   - Health Status:   - Start Date: June 06, 2023   - End Date: None   - Status: Active    Irregular periods   - Category: Problem List Item   - Health Status:   - Start Date: June 06, 2023   - End Date: None   - Status: Active    S/P tubal ligation   - Category: Problem List Item   - Health Status:   - Start Date: June 06, 2023   - End Date: None   - Status: Active    Tobacco use   - Category: Problem List Item   - Health Status:   - Start Date: June 06, 2023   - End Date: None   - Status: Active    Unspecified asthma, uncomplicated   - Category: Problem List Item   - Health Status:   - Start Date: June 06, 2023   - End Date: None   - Status: Active

## 2023-06-10 NOTE — EXTERNAL PATIENT INSTRUCTIONS
Note   Take doxycycline on an empty stomach with a full glass of water. Do not take vitamins with minerals while taking doxycycline it decreases the absorption of the antibioitic. Probiotics advised related to taking this antibiotioc. Do not take within two hours of the antibiotic.   Diagnosis   Otitis media   My name is FANTA Duckworth. I'm a healthcare provider at [facility].   I've reviewed your interview, and I believe you have an infection of the middle ear (otitis media).   Medications   Your pharmacy   [facility] [address] [place], [place] [id number] [phone]     Prescription   Doxycycline monohydrate (100mg): Take 1 tablet by mouth twice a day for 5 days for infection. This medication is an antibiotic. Take it exactly as directed. You must finish the entire course of medication, even if you feel better after the first few days of treatment.    Start taking the antibiotics I've prescribed right away. You need to finish the entire course of antibiotics, even if you start to feel better before the pills run out.    Some women develop yeast infections after taking antibiotics. If you develop a yeast infection, you can treat it with vaginal antifungal creams or suppositories. These are available without a prescription at [employer] and many supermarkets.   About your diagnosis   Otitis media is an infection of the space behind the eardrum. It's one of the most common types of ear infection. Ear infections usually happen after a viral upper respiratory tract infection, also known as the common cold. During a cold, fluid can build up in the middle ear. Bacteria can grow in this fluid buildup and lead to an infection.   The good news is that middle-ear infections usually aren't serious and generally respond well to treatment.   What to expect   If you follow this treatment plan, you should feel better in 2 to 3 days.   When to seek care   Call us at [phone]   with any sudden or  unexpected symptoms.    Symptoms that don't improve after 48 to 72 hours of treatment, or symptoms that get worse despite treatment.    Pain, tenderness, redness, or swelling on the bony part behind your ear along with a fever.    Fever over 103F.    Fever that returns after being gone for more than 24 hours.    The outside of your ear gets swollen and red.    The muscles in your face become paralyzed.    The pain in your ear or the bones around your ear gets worse.    Hearing loss that worsens or doesn't improve.    New ear drainage that brings sudden pain relief.    Bloody ear drainage.    Vomiting.   Prevention   Don't smoke, and try to avoid any kind of smoke or air pollution. Smoke can irritate the ear and worsen infections.   Your provider   Your diagnosis was provided by FANTA Duckworth, a member of your trusted care team at [facility].   If you have any questions, call us at [phone]  .

## 2023-07-03 ENCOUNTER — TELEPHONE (OUTPATIENT)
Dept: OBSTETRICS AND GYNECOLOGY | Facility: CLINIC | Age: 43
End: 2023-07-03

## 2023-07-03 NOTE — TELEPHONE ENCOUNTER
Provider: MICHAEL SHAIKH;   Caller: NICOLA ROSE  Relationship to Patient: SELF   Phone Number: [phone]  Reason for Call: MEDICAL RECORDS  When was the patient last seen: BEFORE [facility]     PATIENT CALLING IN REQUESTING HER MEDICAL RECORDS BE SENT TO DR. RAKAN MARS @ [address] [place] [address] PHONE:[phone] FAX: [phone]          PATIENT SAID DOCTOR IS NEEDING LAST US       PATIENT MEDICAL RECORDS I SOLD EPW RECORDS       THANK YOU     PATIENT CAN BE REACHED .[phone]

## 2023-07-05 NOTE — TELEPHONE ENCOUNTER
PATIENT CALLED BACK ADVISING THAT [facility]ICE IS ALSO REQUESTING THE ENDOMETRIAL BIOPSY, ANY LABS, AND THE PATHOLOGY REPORT - ADVISED PT THAT U/S HAD BEEN FAXED.

## 2023-10-05 ENCOUNTER — OFFICE VISIT (OUTPATIENT)
Dept: FAMILY MEDICINE CLINIC | Facility: CLINIC | Age: 43
End: 2023-10-05
Payer: COMMERCIAL

## 2023-10-05 VITALS
WEIGHT: 293 LBS | HEIGHT: 64 IN | TEMPERATURE: 97.3 F | SYSTOLIC BLOOD PRESSURE: 128 MMHG | HEART RATE: 95 BPM | DIASTOLIC BLOOD PRESSURE: 86 MMHG | BODY MASS INDEX: 50.02 KG/M2 | OXYGEN SATURATION: 99 %

## 2023-10-05 DIAGNOSIS — J01.90 ACUTE BACTERIAL SINUSITIS: Primary | ICD-10-CM

## 2023-10-05 DIAGNOSIS — B96.89 ACUTE BACTERIAL SINUSITIS: Primary | ICD-10-CM

## 2023-10-05 DIAGNOSIS — M25.512 ACUTE PAIN OF LEFT SHOULDER: ICD-10-CM

## 2023-10-05 DIAGNOSIS — J30.9 ALLERGIC RHINITIS, UNSPECIFIED SEASONALITY, UNSPECIFIED TRIGGER: ICD-10-CM

## 2023-10-05 RX ORDER — METHYLPREDNISOLONE 4 MG/1
TABLET ORAL
Qty: 21 TABLET | Refills: 0 | Status: SHIPPED | OUTPATIENT
Start: 2023-10-05

## 2023-10-05 RX ORDER — MEDROXYPROGESTERONE ACETATE 10 MG/1
10 TABLET ORAL DAILY
COMMUNITY
Start: 2023-08-08

## 2023-10-05 RX ORDER — LORATADINE 10 MG/1
CAPSULE, LIQUID FILLED ORAL EVERY 24 HOURS
COMMUNITY
End: 2023-10-05 | Stop reason: SDUPTHER

## 2023-10-05 RX ORDER — CEFDINIR 300 MG/1
300 CAPSULE ORAL 2 TIMES DAILY
Qty: 14 CAPSULE | Refills: 0 | Status: SHIPPED | OUTPATIENT
Start: 2023-10-05 | End: 2023-10-12

## 2023-10-05 RX ORDER — LORATADINE 10 MG/1
10 CAPSULE, LIQUID FILLED ORAL EVERY 24 HOURS
Qty: 30 EACH | Refills: 2 | Status: SHIPPED | OUTPATIENT
Start: 2023-10-05 | End: 2024-01-03

## 2023-10-05 RX ORDER — LIDOCAINE 50 MG/G
1 PATCH TOPICAL EVERY 24 HOURS
Qty: 15 PATCH | Refills: 0 | Status: SHIPPED | OUTPATIENT
Start: 2023-10-05 | End: 2023-10-20

## 2023-10-05 NOTE — PROGRESS NOTES
"Chief Complaint  Shoulder Pain (Left shoulder blade pain, started about 4-5 days ago) and Nasal Congestion (Started about 2 weeks ago)    Subjective      History of Present Illness  Ashley Costa is a 42 y.o. female who presents to [facility] with a past medical history of  Past Medical History:   Diagnosis Date    Allergic Age 3    Anxiety     Asthma Since birth    Diverticulosis     Neuromuscular disorder     Obesity     Urinary tract infection 3-4-2022    Burning when peeing     Left shoulder pain x4-5 days. Been taking ibuprofen. Thought she pulled a muscle. Wants to make sure it's not pneumonia. No injury.     Nasal congestion for about 2 weeks. Just never gets rid of it. No dyspnea. Has a chronic cough from smoking. No mucus production. No fever or chills. Nose constantly clogged. Tried flonase, has been out of the loratadine. No pain with a deep breath.     She is interested in a medrol dose pack to help her pain and sinus symptoms. It has worked well for her before.    Objective   Vital Signs:   Vitals:    10/05/23 1608   BP: 128/86   BP Location: Left arm   Pulse: 95   Temp: 97.3 °F (36.3 °C)   SpO2: 99%   Weight: (!) 142 kg (312 lb)   Height: 162.6 cm (64\")       Wt Readings from Last 3 Encounters:   10/05/23 (!) 142 kg (312 lb)   06/06/23 (!) 141 kg (310 lb 9.6 oz)   03/23/23 136 kg (300 lb)     BP Readings from Last 3 Encounters:   10/05/23 128/86   03/23/23 150/82   02/15/23 152/88         Physical Exam  Vitals and nursing note reviewed.   Constitutional:       General: She is not in acute distress.     Appearance: Normal appearance.   HENT:      Head: Normocephalic and atraumatic.      Right Ear: Tympanic membrane and ear canal normal. There is no impacted cerumen.      Left Ear: Tympanic membrane and ear canal normal. There is no impacted cerumen.      Nose: Rhinorrhea present.      Comments: Maxillary sinus tenderness     Mouth/Throat:      Pharynx: Posterior " oropharyngeal erythema present. No oropharyngeal exudate.   Cardiovascular:      Rate and Rhythm: Normal rate and regular rhythm.      Heart sounds: No murmur heard.  Pulmonary:      Effort: Pulmonary effort is normal. No respiratory distress.      Breath sounds: Normal breath sounds. No wheezing.   Musculoskeletal:      Cervical back: No rigidity or tenderness.      Comments: Left posterior shoulder tenderness diffusely. ROM of left shoulder limited by pain but otherwise full   Lymphadenopathy:      Cervical: No cervical adenopathy.   Skin:     General: Skin is warm and dry.   Neurological:      Mental Status: She is alert.   Psychiatric:         Mood and Affect: Mood normal.         Behavior: Behavior normal.          Result Review :  The following data was reviewed by: Jose Waters MD on 10/05/2023:      Procedures        Assessment and Plan   Diagnoses and all orders for this visit:    1. Acute bacterial sinusitis (Primary)  -     cefdinir (OMNICEF) 300 MG capsule; Take 1 capsule by mouth 2 (Two) Times a Day for 7 days.  Dispense: 14 capsule; Refill: 0  -     methylPREDNISolone (MEDROL) 4 MG dose pack; Take as directed on package instructions.  Dispense: 21 tablet; Refill: 0    2. Acute pain of left shoulder  -     methylPREDNISolone (MEDROL) 4 MG dose pack; Take as directed on package instructions.  Dispense: 21 tablet; Refill: 0  -     lidocaine (LIDODERM) 5 %; Place 1 patch on the skin as directed by provider Daily for 15 days. Remove & Discard patch within 12 hours or as directed by MD  Dispense: 15 patch; Refill: 0    3. Allergic rhinitis, unspecified seasonality, unspecified trigger  -     Loratadine 10 MG capsule; Take 1 capsule by mouth Daily for 90 days.  Dispense: 30 each; Refill: 2    Noted penicillin allergy but she has taken cefdinir before without issue.    Class 3 Severe Obesity (BMI >=40). Obesity-related health conditions include the following: diabetes mellitus. Obesity is unchanged. BMI  is is above average; BMI management plan is completed. We discussed Information on healthy weight added to patient's after visit summary.     Musculoskeletal pain can be relieved with a variety of things. Over-the-counter Tylenol and NSAIDs (such as ibuprofen) can help. Use heat or ice on the affected area - pick the one that works best for you, don't use both. Topicals such as lidocaine patches or voltaren gel can help. Muscle relaxers (such as tizanidine or cyclobenzaprine) can also help. Muscle relaxers cause drowsiness, so only take them at night. Or, if you take them during the day, do not drive or lift anything heavy while on them. Get plenty of rest and drink plenty of water.    Likely diagnosis of sinusitis.  Discussed importance of staying hydrated and taking full course of antibiotics as prescribed.  Discussed that antibiotics can cause side effects including diarrhea.  Advised that taking a probiotic or eating yogurt can help prevent diarrhea while on antibiotics. If symptoms worsen or do not improve, recommend return to office for further workup.         FOLLOW UP  Return if symptoms worsen or fail to improve.  Patient was given instructions and counseling regarding her condition or for health maintenance advice. Please see specific information pulled into the AVS if appropriate.       Jose Waters MD  10/05/23  16:39 EDT    CURRENT & DISCONTINUED MEDICATIONS  Current Outpatient Medications   Medication Instructions    cefdinir (OMNICEF) 300 mg, Oral, 2 Times Daily    lidocaine (LIDODERM) 5 % 1 patch, Transdermal, Every 24 Hours, Remove & Discard patch within 12 hours or as directed by MD    Loratadine 10 mg, Oral, Every 24 Hours    medroxyPROGESTERone (PROVERA) 10 mg, Daily    methylPREDNISolone (MEDROL) 4 MG dose pack Take as directed on package instructions.    polyethylene glycol (MIRALAX) 17 g, Oral, Daily       Medications Discontinued During This Encounter   Medication Reason    Loratadine  10 MG capsule Reorder

## 2023-12-04 ENCOUNTER — TELEPHONE (OUTPATIENT)
Dept: FAMILY MEDICINE CLINIC | Facility: CLINIC | Age: 43
End: 2023-12-04

## 2023-12-04 DIAGNOSIS — R73.03 PREDIABETES: Primary | ICD-10-CM

## 2023-12-04 NOTE — TELEPHONE ENCOUNTER
Caller: Ashley Costa    Relationship: Self    Best call back number: [phone]    What orders are you requesting (i.e. lab or imaging): ANNUAL LABS    In what timeframe would the patient need to come in: ASAP    Where will you receive your lab/imaging services: [facility]    Additional notes: PATIENTS WOULD LIKE A CALL WHEN THE ORDERS ARE PLACED

## 2023-12-11 ENCOUNTER — TELEMEDICINE (OUTPATIENT)
Dept: FAMILY MEDICINE CLINIC | Facility: TELEHEALTH | Age: 43
End: 2023-12-11
Payer: COMMERCIAL

## 2023-12-11 ENCOUNTER — HOSPITAL ENCOUNTER (OUTPATIENT)
Dept: MAMMOGRAPHY | Facility: HOSPITAL | Age: 43
Discharge: HOME | End: 2023-12-11
Admitting: OBSTETRICS & GYNECOLOGY
Payer: COMMERCIAL

## 2023-12-11 DIAGNOSIS — J32.9 BACTERIAL SINUSITIS: Primary | ICD-10-CM

## 2023-12-11 DIAGNOSIS — B96.89 BACTERIAL SINUSITIS: Primary | ICD-10-CM

## 2023-12-11 DIAGNOSIS — Z12.31 VISIT FOR SCREENING MAMMOGRAM: ICD-10-CM

## 2023-12-11 PROCEDURE — 77063 BREAST TOMOSYNTHESIS BI: CPT

## 2023-12-11 PROCEDURE — 77067 SCR MAMMO BI INCL CAD: CPT

## 2023-12-11 RX ORDER — BACLOFEN 10 MG/1
TABLET ORAL
COMMUNITY

## 2023-12-11 RX ORDER — FLUTICASONE PROPIONATE 50 MCG
2 SPRAY, SUSPENSION (ML) NASAL DAILY
Qty: 16 G | Refills: 0 | Status: SHIPPED | OUTPATIENT
Start: 2023-12-11 | End: 2024-01-10

## 2023-12-11 RX ORDER — TIZANIDINE 4 MG/1
TABLET ORAL
COMMUNITY

## 2023-12-11 RX ORDER — DICLOFENAC SODIUM 75 MG/1
TABLET, DELAYED RELEASE ORAL
COMMUNITY

## 2023-12-11 RX ORDER — PREDNISONE 20 MG/1
20 TABLET ORAL DAILY
Qty: 7 TABLET | Refills: 0 | Status: SHIPPED | OUTPATIENT
Start: 2023-12-11 | End: 2023-12-18

## 2023-12-11 RX ORDER — DOXYCYCLINE HYCLATE 100 MG/1
100 CAPSULE ORAL 2 TIMES DAILY
Qty: 14 CAPSULE | Refills: 0 | Status: SHIPPED | OUTPATIENT
Start: 2023-12-11 | End: 2023-12-18

## 2023-12-11 RX ORDER — CYCLOBENZAPRINE HCL 5 MG
TABLET ORAL
COMMUNITY
Start: 2023-11-27

## 2023-12-11 RX ORDER — IPRATROPIUM BROMIDE AND ALBUTEROL SULFATE 2.5; .5 MG/3ML; MG/3ML
SOLUTION RESPIRATORY (INHALATION)
COMMUNITY

## 2023-12-11 RX ORDER — ALBUTEROL SULFATE 2.5 MG/3ML
SOLUTION RESPIRATORY (INHALATION)
COMMUNITY

## 2023-12-11 NOTE — PROGRESS NOTES
Chief Complaint   Patient presents with    Nasal Congestion    Earache       Video Visit Reason:   Free Text Description: My right nasal is swollen and hurts and ear on right side  Subjective   Ashley Costa is a 43 y.o. female.     History of Present Illness  Right maxillary sinus pain and facial swelling on the right for the last couple of days after about 2 weeks of runny nose and sinus congestion. Sinus drainage has become thick, green and had to clear on the right, left is completely blocked. She denies fever, chills, shortness of air or wheezing. Right ear is hurting and popping with sinus congestion. She tried azelastine nasal spray but it made her vomit.   Earache   There is pain in the right ear. The current episode started in the past 7 days. The problem has been gradually worsening. There has been no fever. Associated symptoms include headaches. Pertinent negatives include no coughing, ear discharge or sore throat.       The following portions of the patient's history were reviewed and updated as appropriate: allergies, current medications, past medical history, and problem list.      Past Medical History:   Diagnosis Date    Allergic Age 3    Anxiety     Asthma Since birth    Diverticulosis     Neuromuscular disorder     Obesity     Urinary tract infection 3-4-2022    Burning when peeing     Social History     Socioeconomic History    Marital status: Single   Tobacco Use    Smoking status: Every Day     Packs/day: 1.00     Years: 15.00     Additional pack years: 0.00     Total pack years: 15.00     Types: Cigarettes     Start date: 1/1/1996     Passive exposure: Current    Smokeless tobacco: Never   Vaping Use    Vaping Use: Never used   Substance and Sexual Activity    Alcohol use: Never    Drug use: Never    Sexual activity: Yes     Partners: Male     Birth control/protection: Other, Essure     Comment: Issures     medication documentation: reviewed and updated with patient and   Current Outpatient  Medications:     albuterol (PROVENTIL) (2.5 MG/3ML) 0.083% nebulizer solution, Inhale 3 mL 3 times a day by nebulization route as needed., Disp: , Rfl:     baclofen (LIORESAL) 10 MG tablet, Take 1 tablet 4 times a day by oral route., Disp: , Rfl:     cholecalciferol (VITAMIN D3) 1.25 MG (18198 UT) capsule, take one po q week, Disp: , Rfl:     cyclobenzaprine (FLEXERIL) 5 MG tablet, , Disp: , Rfl:     diclofenac (VOLTAREN) 75 MG EC tablet, , Disp: , Rfl:     ipratropium-albuterol (DUO-NEB) 0.5-2.5 mg/3 ml nebulizer, prn treatment, Disp: , Rfl:     Loratadine 10 MG capsule, Take 1 capsule by mouth Daily for 90 days., Disp: 30 each, Rfl: 2    medroxyPROGESTERone (PROVERA) 10 MG tablet, Take 1 tablet by mouth Daily., Disp: , Rfl:     polyethylene glycol (MIRALAX) 17 g packet, Take 17 g by mouth Daily., Disp: 30 each, Rfl: 2    tiZANidine (ZANAFLEX) 4 MG tablet, take one po qd, Disp: , Rfl:     doxycycline (VIBRAMYCIN) 100 MG capsule, Take 1 capsule by mouth 2 (Two) Times a Day for 7 days., Disp: 14 capsule, Rfl: 0    fluticasone (FLONASE) 50 MCG/ACT nasal spray, 2 sprays into the nostril(s) as directed by provider Daily for 30 days. Administer 2 sprays in each nostril for each dose., Disp: 16 g, Rfl: 0    predniSONE (DELTASONE) 20 MG tablet, Take 1 tablet by mouth Daily for 7 days., Disp: 7 tablet, Rfl: 0  Review of Systems   Constitutional:  Positive for fatigue. Negative for chills and fever.   HENT:  Positive for congestion, ear pain, facial swelling (over right maxillary sinus), postnasal drip, sinus pressure and sinus pain. Negative for ear discharge, sore throat and trouble swallowing.    Respiratory:  Negative for cough, shortness of breath and wheezing.    Neurological:  Positive for headaches.       Objective   Physical Exam  Constitutional:       Appearance: Normal appearance. She is well-developed.   HENT:      Nose: Congestion present.      Right Sinus: Maxillary sinus tenderness and frontal sinus tenderness  present.      Left Sinus: No maxillary sinus tenderness or frontal sinus tenderness.      Mouth/Throat:      Pharynx: Uvula midline. Posterior oropharyngeal erythema present.   Eyes:      Conjunctiva/sclera: Conjunctivae normal.   Pulmonary:      Effort: Pulmonary effort is normal.   Lymphadenopathy:      Head:      Right side of head: No tonsillar, preauricular or posterior auricular adenopathy.      Left side of head: No tonsillar, preauricular or posterior auricular adenopathy.      Cervical: No cervical adenopathy (patient guided exam).   Psychiatric:         Speech: Speech normal.         Assessment & Plan   Diagnoses and all orders for this visit:    1. Bacterial sinusitis (Primary)  -     doxycycline (VIBRAMYCIN) 100 MG capsule; Take 1 capsule by mouth 2 (Two) Times a Day for 7 days.  Dispense: 14 capsule; Refill: 0  -     fluticasone (FLONASE) 50 MCG/ACT nasal spray; 2 sprays into the nostril(s) as directed by provider Daily for 30 days. Administer 2 sprays in each nostril for each dose.  Dispense: 16 g; Refill: 0  -     predniSONE (DELTASONE) 20 MG tablet; Take 1 tablet by mouth Daily for 7 days.  Dispense: 7 tablet; Refill: 0                    Follow Up:  If your symptoms are not resolving by the completion of your treatment or are worsening, see your primary care provider for follow up. If you don't have a primary care provider, you may go to any Urgent Care for re-evaluation. If you develop any life threatening symptoms, go to the nearest Emergency Department immediately or call EMS.               The use of  Video Visit was utilized during this visit, using both [employer] and [employer]. The use of a video visit has been reviewed with the patient and verbal informed consent has been obtained. No technical difficulties occurred during the visit.    is located at [address] [place] [id number]  Provider is located at [place], [place]

## 2023-12-21 ENCOUNTER — OFFICE VISIT (OUTPATIENT)
Dept: FAMILY MEDICINE CLINIC | Facility: CLINIC | Age: 43
End: 2023-12-21
Payer: COMMERCIAL

## 2023-12-21 VITALS
DIASTOLIC BLOOD PRESSURE: 100 MMHG | SYSTOLIC BLOOD PRESSURE: 167 MMHG | OXYGEN SATURATION: 98 % | WEIGHT: 293 LBS | BODY MASS INDEX: 53.23 KG/M2 | TEMPERATURE: 97.8 F | HEART RATE: 91 BPM

## 2023-12-21 DIAGNOSIS — J30.9 ALLERGIC RHINITIS, UNSPECIFIED SEASONALITY, UNSPECIFIED TRIGGER: ICD-10-CM

## 2023-12-21 DIAGNOSIS — B96.89 BACTERIAL SINUSITIS: Primary | ICD-10-CM

## 2023-12-21 DIAGNOSIS — J32.9 BACTERIAL SINUSITIS: Primary | ICD-10-CM

## 2023-12-21 PROCEDURE — 1159F MED LIST DOCD IN RCRD: CPT | Performed by: FAMILY MEDICINE

## 2023-12-21 PROCEDURE — 1160F RVW MEDS BY RX/DR IN RCRD: CPT | Performed by: FAMILY MEDICINE

## 2023-12-21 PROCEDURE — 99213 OFFICE O/P EST LOW 20 MIN: CPT | Performed by: FAMILY MEDICINE

## 2023-12-21 RX ORDER — METHYLPREDNISOLONE 4 MG/1
TABLET ORAL
Qty: 1 EACH | Refills: 0 | Status: SHIPPED | OUTPATIENT
Start: 2023-12-21

## 2023-12-21 RX ORDER — CEFDINIR 300 MG/1
300 CAPSULE ORAL 2 TIMES DAILY
Qty: 14 CAPSULE | Refills: 0 | Status: SHIPPED | OUTPATIENT
Start: 2023-12-21 | End: 2023-12-28

## 2023-12-21 RX ORDER — DOXYCYCLINE HYCLATE 100 MG/1
100 CAPSULE ORAL 2 TIMES DAILY
COMMUNITY
End: 2023-12-21

## 2023-12-21 RX ORDER — CETIRIZINE HYDROCHLORIDE 10 MG/1
10 TABLET ORAL DAILY
Qty: 90 TABLET | Refills: 3 | Status: SHIPPED | OUTPATIENT
Start: 2023-12-21

## 2023-12-21 NOTE — PROGRESS NOTES
Chief Complaint  Headache, Nasal Congestion (X4 weeks), Ear Fullness, and Earache    Subjective      History of Present Illness  Ashley Costa is a 43 y.o. female who presents to [facility] with a past medical history of  Past Medical History:   Diagnosis Date    Allergic Age 3    Anxiety     Asthma Since birth    Diverticulosis     Neuromuscular disorder     Obesity     Urinary tract infection 3-4-2022    Burning when peeing     Headache, nasal congestion, ear fullness, ear ache.  The nasal congestion has been going on for about 4 weeks.  She had a telehealth visit on 12/11/2023 at that time was diagnosed with bacterial sinusitis and given a course of doxycycline, Flonase, and prednisone.  She feels like things are getting worse instead of better.  She says the prednisone made her heart race and did not like it - she says that methylprednisolone usually works better. She is still congested in the nose and ears.  Mucus is still greenish-yellow. Sinus headache. Ears full. Chills but no fever. No body aches, no sore throat. Covid was negative at home. Her 2 girls had flu tested and were negative.    Objective   Vital Signs:   Vitals:    12/21/23 1608   BP: 167/100   BP Location: Right arm   Patient Position: Sitting   Cuff Size: Adult   Pulse: 91   Temp: 97.8 °F (36.6 °C)   SpO2: 98%   Weight: (!) 141 kg (310 lb 1.6 oz)     Body mass index is 53.23 kg/m².    Wt Readings from Last 3 Encounters:   12/21/23 (!) 141 kg (310 lb 1.6 oz)   10/05/23 (!) 142 kg (312 lb)   06/06/23 (!) 141 kg (310 lb 9.6 oz)     BP Readings from Last 3 Encounters:   12/21/23 167/100   10/05/23 128/86   03/23/23 150/82       Health Maintenance   Topic Date Due    Pneumococcal Vaccine 0-64 (1 - PCV) Never done    HEPATITIS C SCREENING  Never done    ANNUAL PHYSICAL  Never done    COVID-19 Vaccine (1) 12/23/2023 (Originally 5/11/1981)    INFLUENZA VACCINE  03/31/2024 (Originally 8/1/2023)    PAP SMEAR   06/17/2024    BMI FOLLOWUP  10/05/2024    TDAP/TD VACCINES (2 - Td or Tdap) 11/08/2028       Physical Exam  Vitals and nursing note reviewed.   Constitutional:       General: She is not in acute distress.     Appearance: Normal appearance.   HENT:      Head: Normocephalic and atraumatic.      Right Ear: Tympanic membrane and ear canal normal. There is no impacted cerumen.      Left Ear: Tympanic membrane and ear canal normal. There is no impacted cerumen.      Nose: Congestion and rhinorrhea present.      Comments: Bilateral maxillary sinus tenderness     Mouth/Throat:      Pharynx: No oropharyngeal exudate or posterior oropharyngeal erythema.   Cardiovascular:      Rate and Rhythm: Normal rate and regular rhythm.      Heart sounds: No murmur heard.  Pulmonary:      Effort: Pulmonary effort is normal. No respiratory distress.      Breath sounds: Normal breath sounds. No wheezing.   Musculoskeletal:      Cervical back: No rigidity or tenderness.   Lymphadenopathy:      Cervical: No cervical adenopathy.   Skin:     General: Skin is warm and dry.   Neurological:      Mental Status: She is alert.   Psychiatric:         Mood and Affect: Mood normal.         Behavior: Behavior normal.            Result Review :  The following data was reviewed by: Jose Waters MD on 12/21/2023:      Procedures        Assessment and Plan   Diagnoses and all orders for this visit:    1. Bacterial sinusitis (Primary)  -     cefdinir (OMNICEF) 300 MG capsule; Take 1 capsule by mouth 2 (Two) Times a Day for 7 days.  Dispense: 14 capsule; Refill: 0    2. Allergic rhinitis, unspecified seasonality, unspecified trigger  -     cetirizine (zyrTEC) 10 MG tablet; Take 1 tablet by mouth Daily.  Dispense: 90 tablet; Refill: 3    Other orders  -     methylPREDNISolone (Medrol) 4 MG dose pack; Take as directed on package instructions.  Dispense: 1 each; Refill: 0      Likely diagnosis of sinusitis.  Will switch antibiotic to cefdinir as she  completed the doxycycline without any benefit.  She has tolerated cefdinir before despite penicillin allergy.  Discussed importance of staying hydrated and taking full course of antibiotics as prescribed.  Discussed that antibiotics can cause side effects including diarrhea.  Advised that taking a probiotic or eating yogurt can help prevent diarrhea while on antibiotics. If symptoms worsen or do not improve, recommend return to office for further workup.     If no improvement after second antibiotic round, per up-to-date guidelines she may need imaging of the sinuses.         FOLLOW UP  Return if symptoms worsen or fail to improve.  Patient was given instructions and counseling regarding her condition or for health maintenance advice. Please see specific information pulled into the AVS if appropriate.       Jose Waters MD  12/21/23  16:30 EST    CURRENT & DISCONTINUED MEDICATIONS  Current Outpatient Medications   Medication Instructions    albuterol (PROVENTIL) (2.5 MG/3ML) 0.083% nebulizer solution Inhale 3 mL 3 times a day by nebulization route as needed.    cefdinir (OMNICEF) 300 mg, Oral, 2 Times Daily    cetirizine (ZYRTEC) 10 mg, Oral, Daily    fluticasone (FLONASE) 50 MCG/ACT nasal spray 2 sprays, Nasal, Daily, Administer 2 sprays in each nostril for each dose.    ipratropium-albuterol (DUO-NEB) 0.5-2.5 mg/3 ml nebulizer prn treatment    methylPREDNISolone (Medrol) 4 MG dose pack Take as directed on package instructions.    polyethylene glycol (MIRALAX) 17 g, Oral, Daily       Medications Discontinued During This Encounter   Medication Reason    baclofen (LIORESAL) 10 MG tablet     cholecalciferol (VITAMIN D3) 1.25 MG (59877 UT) capsule     cyclobenzaprine (FLEXERIL) 5 MG tablet     diclofenac (VOLTAREN) 75 MG EC tablet     Loratadine 10 MG capsule     medroxyPROGESTERone (PROVERA) 10 MG tablet     tiZANidine (ZANAFLEX) 4 MG tablet     doxycycline (VIBRAMYCIN) 100 MG capsule

## 2024-01-02 DIAGNOSIS — J01.90 SUBACUTE RHINOSINUSITIS: Primary | ICD-10-CM

## 2024-01-09 ENCOUNTER — OFFICE VISIT (OUTPATIENT)
Dept: FAMILY MEDICINE CLINIC | Facility: CLINIC | Age: 44
End: 2024-01-09
Payer: COMMERCIAL

## 2024-01-09 VITALS
HEART RATE: 86 BPM | BODY MASS INDEX: 50.02 KG/M2 | HEIGHT: 64 IN | TEMPERATURE: 97.5 F | WEIGHT: 293 LBS | OXYGEN SATURATION: 100 %

## 2024-01-09 DIAGNOSIS — J20.9 ACUTE BRONCHITIS, UNSPECIFIED ORGANISM: ICD-10-CM

## 2024-01-09 DIAGNOSIS — J01.90 ACUTE SINUSITIS, RECURRENCE NOT SPECIFIED, UNSPECIFIED LOCATION: Primary | ICD-10-CM

## 2024-01-09 LAB
FLUAV SUBTYP SPEC NAA+PROBE: NOT DETECTED
FLUBV RNA ISLT QL NAA+PROBE: NOT DETECTED
RSV RNA NPH QL NAA+NON-PROBE: DETECTED
SARS-COV-2 RNA RESP QL NAA+PROBE: NOT DETECTED

## 2024-01-09 PROCEDURE — 99213 OFFICE O/P EST LOW 20 MIN: CPT | Performed by: FAMILY MEDICINE

## 2024-01-09 PROCEDURE — 87637 SARSCOV2&INF A&B&RSV AMP PRB: CPT | Performed by: FAMILY MEDICINE

## 2024-01-09 RX ORDER — AZITHROMYCIN 250 MG/1
TABLET, FILM COATED ORAL
Qty: 6 TABLET | Refills: 0 | Status: SHIPPED | OUTPATIENT
Start: 2024-01-09

## 2024-01-09 RX ORDER — BENZONATATE 200 MG/1
200 CAPSULE ORAL 3 TIMES DAILY PRN
Qty: 15 CAPSULE | Refills: 0 | Status: SHIPPED | OUTPATIENT
Start: 2024-01-09

## 2024-01-09 RX ORDER — ALBUTEROL SULFATE 2.5 MG/3ML
2.5 SOLUTION RESPIRATORY (INHALATION) EVERY 6 HOURS PRN
Qty: 30 ML | Refills: 1 | Status: SHIPPED | OUTPATIENT
Start: 2024-01-09

## 2024-01-09 RX ORDER — DEXTROMETHORPHAN HYDROBROMIDE AND PROMETHAZINE HYDROCHLORIDE 15; 6.25 MG/5ML; MG/5ML
5 SYRUP ORAL NIGHTLY PRN
Qty: 118 ML | Refills: 0 | Status: SHIPPED | OUTPATIENT
Start: 2024-01-09

## 2024-01-09 RX ORDER — PREDNISONE 20 MG/1
40 TABLET ORAL DAILY
Qty: 10 TABLET | Refills: 0 | Status: SHIPPED | OUTPATIENT
Start: 2024-01-09 | End: 2024-01-14

## 2024-01-09 NOTE — PROGRESS NOTES
"Chief Complaint    URI (Sinus pressure/pain, lime green discolored drainage, cougg.  Currently using DayQuil and Nasal spray.  Is out of nebulizer solution.)    Subjective      Ashley Costa presents to [facility]    History of Present Illness    1.) URI SXS : Onset - > 1.3.24. Patient presents with sinus pressure/pain. Greenish drainage. Also reporting a cough. No c/o fevers or chills. Recent sick contact at home. Hx of asthma. Current smoker.    Objective     Vital Signs:     Pulse 86   Temp 97.5 °F (36.4 °C) (Temporal)   Ht 162.6 cm (64\")   Wt (!) 141 kg (311 lb)   SpO2 100%   BMI 53.38 kg/m²       Physical Exam  Vitals reviewed.   Constitutional:       General: She is not in acute distress.     Appearance: Normal appearance. She is well-developed.   HENT:      Head: Normocephalic and atraumatic.      Right Ear: Hearing and external ear normal. A middle ear effusion is present.      Left Ear: Hearing and external ear normal. A middle ear effusion is present.      Nose: Nose normal. Congestion and rhinorrhea present.      Mouth/Throat:      Pharynx: Posterior oropharyngeal erythema present.   Eyes:      General: Lids are normal.         Right eye: No discharge.         Left eye: No discharge.      Conjunctiva/sclera: Conjunctivae normal.   Pulmonary:      Effort: Pulmonary effort is normal. No respiratory distress.      Breath sounds: Normal breath sounds. No stridor. No wheezing, rhonchi or rales.   Abdominal:      General: There is no distension.   Musculoskeletal:         General: No swelling.      Cervical back: Neck supple.   Skin:     Coloration: Skin is not jaundiced.      Findings: No erythema.   Neurological:      Mental Status: She is alert. Mental status is at baseline.   Psychiatric:         Mood and Affect: Mood and affect normal.         Thought Content: Thought content normal.     Assessment and Plan     Diagnoses and all orders for this visit:    1. Acute " sinusitis, recurrence not specified, unspecified location (Primary)  Comments:  Start medications as noted. Refrain from smoking. Adequate fluids and rest. Testing as noted. Precautions discussed.  Orders:  -     COVID-19, FLU A/B, RSV PCR 1 HR TAT - Swab, Nasopharynx; Future    2. Acute bronchitis, unspecified organism  Comments:  See above.  Orders:  -     COVID-19, FLU A/B, RSV PCR 1 HR TAT - Swab, Nasopharynx; Future    Other orders  -     azithromycin (Zithromax Z-Aleks) 250 MG tablet; Take 2 tablets by mouth on day 1, then 1 tablet daily on days 2-5  Dispense: 6 tablet; Refill: 0  -     predniSONE (DELTASONE) 20 MG tablet; Take 2 tablets by mouth Daily for 5 days.  Dispense: 10 tablet; Refill: 0  -     promethazine-dextromethorphan (PROMETHAZINE-DM) 6.25-15 MG/5ML syrup; Take 5 mL by mouth At Night As Needed for Cough.  Dispense: 118 mL; Refill: 0  -     benzonatate (TESSALON) 200 MG capsule; Take 1 capsule by mouth 3 (Three) Times a Day As Needed for Cough.  Dispense: 15 capsule; Refill: 0  -     albuterol (PROVENTIL) (2.5 MG/3ML) 0.083% nebulizer solution; Take 2.5 mg by nebulization Every 6 (Six) Hours As Needed for Wheezing or Shortness of Air.  Dispense: 30 mL; Refill: 1    Follow Up     Return if symptoms worsen or fail to improve.    Patient was given instructions and counseling regarding her condition or for health maintenance advice. Please see specific information pulled into the AVS if appropriate.

## 2024-01-25 ENCOUNTER — HOSPITAL ENCOUNTER (OUTPATIENT)
Dept: CT IMAGING | Facility: HOSPITAL | Age: 44
Discharge: HOME | End: 2024-01-25
Admitting: FAMILY MEDICINE
Payer: COMMERCIAL

## 2024-01-25 DIAGNOSIS — J01.90 SUBACUTE RHINOSINUSITIS: ICD-10-CM

## 2024-01-25 PROCEDURE — 70486 CT MAXILLOFACIAL W/O DYE: CPT

## 2024-01-26 ENCOUNTER — TELEPHONE (OUTPATIENT)
Dept: FAMILY MEDICINE CLINIC | Facility: CLINIC | Age: 44
End: 2024-01-26
Payer: COMMERCIAL

## 2024-01-26 DIAGNOSIS — J32.9 BACTERIAL SINUSITIS: ICD-10-CM

## 2024-01-26 DIAGNOSIS — B96.89 BACTERIAL SINUSITIS: ICD-10-CM

## 2024-01-26 RX ORDER — FLUTICASONE PROPIONATE 50 MCG
2 SPRAY, SUSPENSION (ML) NASAL DAILY
Qty: 16 G | Refills: 2 | Status: SHIPPED | OUTPATIENT
Start: 2024-01-26 | End: 2024-02-25

## 2024-01-26 NOTE — TELEPHONE ENCOUNTER
She said she is out of the nasal spray, and you had told her to keep using that, she cannot get in to ENT until April. She would like the nasal spray sent to [facility].

## 2024-01-26 NOTE — TELEPHONE ENCOUNTER
I LEFT MESSAGE FOR PATIENT TO CALL BACK IF SHE WANTS TO GO TO ANOTHER ENT GROUP IN [facility]. I LEFT MY DIRECT NUMBER [phone]

## 2024-04-11 ENCOUNTER — OFFICE VISIT (OUTPATIENT)
Dept: FAMILY MEDICINE CLINIC | Facility: CLINIC | Age: 44
End: 2024-04-11
Payer: COMMERCIAL

## 2024-04-11 VITALS
DIASTOLIC BLOOD PRESSURE: 78 MMHG | TEMPERATURE: 97.5 F | HEART RATE: 104 BPM | WEIGHT: 293 LBS | BODY MASS INDEX: 54.93 KG/M2 | SYSTOLIC BLOOD PRESSURE: 116 MMHG | OXYGEN SATURATION: 96 %

## 2024-04-11 DIAGNOSIS — J01.90 ACUTE SINUSITIS, RECURRENCE NOT SPECIFIED, UNSPECIFIED LOCATION: Primary | ICD-10-CM

## 2024-04-11 PROCEDURE — 99213 OFFICE O/P EST LOW 20 MIN: CPT | Performed by: NURSE PRACTITIONER

## 2024-04-11 PROCEDURE — 1160F RVW MEDS BY RX/DR IN RCRD: CPT | Performed by: NURSE PRACTITIONER

## 2024-04-11 PROCEDURE — 1159F MED LIST DOCD IN RCRD: CPT | Performed by: NURSE PRACTITIONER

## 2024-04-11 RX ORDER — AZITHROMYCIN 250 MG/1
TABLET, FILM COATED ORAL
Qty: 6 TABLET | Refills: 0 | Status: SHIPPED | OUTPATIENT
Start: 2024-04-11

## 2024-04-11 RX ORDER — BROMPHENIRAMINE MALEATE, PSEUDOEPHEDRINE HYDROCHLORIDE, AND DEXTROMETHORPHAN HYDROBROMIDE 2; 30; 10 MG/5ML; MG/5ML; MG/5ML
5 SYRUP ORAL 4 TIMES DAILY PRN
Qty: 473 ML | Refills: 0 | Status: SHIPPED | OUTPATIENT
Start: 2024-04-11

## 2024-04-11 RX ORDER — DEXAMETHASONE 4 MG/1
4 TABLET ORAL
Qty: 5 TABLET | Refills: 0 | Status: SHIPPED | OUTPATIENT
Start: 2024-04-11

## 2024-04-11 NOTE — PROGRESS NOTES
Chief Complaint  Nasal Congestion (Sinus pressure and congestion )    Subjective        Past Medical History:   Diagnosis Date    Allergic Age 3    Anxiety     Asthma Since birth    Diverticulosis     Neuromuscular disorder     Obesity     Urinary tract infection 3-4-2022    Burning when peeing     Social History     Tobacco Use    Smoking status: Every Day     Current packs/day: 1.00     Average packs/day: 1 pack/day for 28.3 years (28.3 ttl pk-yrs)     Types: Cigarettes     Start date: 1/1/1996     Passive exposure: Current    Smokeless tobacco: Never   Vaping Use    Vaping status: Never Used   Substance Use Topics    Alcohol use: Never    Drug use: Never      Current Outpatient Medications on File Prior to Visit   Medication Sig    albuterol (PROVENTIL) (2.5 MG/3ML) 0.083% nebulizer solution Take 2.5 mg by nebulization Every 6 (Six) Hours As Needed for Wheezing or Shortness of Air.    cetirizine (zyrTEC) 10 MG tablet Take 1 tablet by mouth Daily.    ipratropium-albuterol (DUO-NEB) 0.5-2.5 mg/3 ml nebulizer prn treatment    polyethylene glycol (MIRALAX) 17 g packet Take 17 g by mouth Daily.    fluticasone (FLONASE) 50 MCG/ACT nasal spray 2 sprays into the nostril(s) as directed by provider Daily for 30 days. Administer 2 sprays in each nostril for each dose.     No current facility-administered medications on file prior to visit.      Allergies   Allergen Reactions    Penicillin G Anaphylaxis      Health Maintenance Due   Topic Date Due    Pneumococcal Vaccine 0-64 (1 of 2 - PCV) Never done    HEPATITIS C SCREENING  Never done    ANNUAL PHYSICAL  Never done    COVID-19 Vaccine (1 - 2023-24 season) Never done      Ashley Costa presents to [facility]  History of Present Illness  Here with sinus pressure and congestion x 3 days. Notes she will cough due to the drainage. Pt notes daughter with similar symptoms. She is using cetirizine and fluticasone without improvement.      Denies fever, chills, body aches, nausea, vomiting, or diarrhea.     Pt also notes ankle swelling that causes pain - notes she has not been eating salt. Started after having UTI a few weeks ago. She was on Keflex. Improves with propping the legs.     Objective   Vital Signs:   /78   Pulse 104   Temp 97.5 °F (36.4 °C)   Wt (!) 145 kg (320 lb)   SpO2 96%   BMI 54.93 kg/m²     Review of Systems   Physical Exam  Vitals reviewed.   Constitutional:       General: She is not in acute distress.     Appearance: Normal appearance. She is well-developed.   HENT:      Head: Normocephalic and atraumatic.      Right Ear: Tympanic membrane, ear canal and external ear normal.      Left Ear: Tympanic membrane, ear canal and external ear normal.      Nose: Congestion present.      Right Sinus: Frontal sinus tenderness present.      Left Sinus: Frontal sinus tenderness present.   Eyes:      Conjunctiva/sclera: Conjunctivae normal.      Pupils: Pupils are equal, round, and reactive to light.   Cardiovascular:      Rate and Rhythm: Normal rate and regular rhythm.      Heart sounds: Normal heart sounds.   Pulmonary:      Effort: Pulmonary effort is normal.      Breath sounds: Normal breath sounds.   Musculoskeletal:      Cervical back: Neck supple.      Right lower leg: No edema.      Left lower leg: No edema.   Skin:     General: Skin is warm and dry.   Neurological:      Mental Status: She is alert and oriented to person, place, and time.   Psychiatric:         Mood and Affect: Mood and affect normal.         Behavior: Behavior normal.         Thought Content: Thought content normal.         Judgment: Judgment normal.        Result Review :   The following data was reviewed by: FANTA Hilton on 04/11/2024:  Common labs          7/27/2023    13:47   Common Labs   WBC 8.25       Hemoglobin 13.9       Hematocrit 44.4       Platelets 182          Details          This result is from an external source.              Data reviewed : Radiologic studies CT sinuses           Assessment and Plan    Diagnoses and all orders for this visit:    1. Acute sinusitis, recurrence not specified, unspecified location (Primary)  -     azithromycin (Zithromax Z-Aleks) 250 MG tablet; Take 2 tablets by mouth on day 1, then 1 tablet daily on days 2-5  Dispense: 6 tablet; Refill: 0  -     dexAMETHasone (DECADRON) 4 MG tablet; Take 1 tablet by mouth Daily With Breakfast.  Dispense: 5 tablet; Refill: 0  -     brompheniramine-pseudoephedrine-DM 30-2-10 MG/5ML syrup; Take 5 mL by mouth 4 (Four) Times a Day As Needed for Cough or Congestion.  Dispense: 473 mL; Refill: 0      Based on symptoms and patient history we will start patient on azithromycin for acute sinusitis.  Patient to follow-up with no improvement.  Discussed with patient to monitor dietary intake with use of steroid especially with recent weight gain.  Patient to monitor blood pressure with a history of isolated elevated blood pressure in December.            Follow Up   Return if symptoms worsen or fail to improve.  Patient was given instructions and counseling regarding her condition or for health maintenance advice. Please see specific information pulled into the AVS if appropriate.

## 2024-05-15 ENCOUNTER — OFFICE VISIT (OUTPATIENT)
Dept: FAMILY MEDICINE CLINIC | Facility: CLINIC | Age: 44
End: 2024-05-15
Payer: COMMERCIAL

## 2024-05-15 VITALS
SYSTOLIC BLOOD PRESSURE: 160 MMHG | TEMPERATURE: 97.5 F | BODY MASS INDEX: 54.67 KG/M2 | HEART RATE: 93 BPM | DIASTOLIC BLOOD PRESSURE: 90 MMHG | WEIGHT: 293 LBS | OXYGEN SATURATION: 98 %

## 2024-05-15 DIAGNOSIS — B96.89 BACTERIAL SINUSITIS: ICD-10-CM

## 2024-05-15 DIAGNOSIS — J32.9 BACTERIAL SINUSITIS: ICD-10-CM

## 2024-05-15 PROCEDURE — 1125F AMNT PAIN NOTED PAIN PRSNT: CPT | Performed by: NURSE PRACTITIONER

## 2024-05-15 PROCEDURE — 99213 OFFICE O/P EST LOW 20 MIN: CPT | Performed by: NURSE PRACTITIONER

## 2024-05-15 PROCEDURE — 1159F MED LIST DOCD IN RCRD: CPT | Performed by: NURSE PRACTITIONER

## 2024-05-15 PROCEDURE — 1160F RVW MEDS BY RX/DR IN RCRD: CPT | Performed by: NURSE PRACTITIONER

## 2024-05-15 RX ORDER — CEFDINIR 300 MG/1
300 CAPSULE ORAL 2 TIMES DAILY
Qty: 20 CAPSULE | Refills: 0 | Status: SHIPPED | OUTPATIENT
Start: 2024-05-15

## 2024-05-15 RX ORDER — METHYLPREDNISOLONE 4 MG/1
TABLET ORAL
Qty: 1 EACH | Refills: 0 | Status: SHIPPED | OUTPATIENT
Start: 2024-05-15

## 2024-05-15 NOTE — PROGRESS NOTES
Chief Complaint  Cough (Green and yellow /X 1 week ), Earache (Both ears ), and Sinus Problem    Subjective        Past Medical History:   Diagnosis Date    Allergic Age 3    Anxiety     Asthma Since birth    Diverticulosis     Neuromuscular disorder     Obesity     Urinary tract infection 3-4-2022    Burning when peeing     Social History     Tobacco Use    Smoking status: Every Day     Current packs/day: 1.00     Average packs/day: 1 pack/day for 28.4 years (28.4 ttl pk-yrs)     Types: Cigarettes     Start date: 1/1/1996     Passive exposure: Current    Smokeless tobacco: Never   Vaping Use    Vaping status: Never Used   Substance Use Topics    Alcohol use: Never    Drug use: Never      Current Outpatient Medications on File Prior to Visit   Medication Sig    cetirizine (zyrTEC) 10 MG tablet Take 1 tablet by mouth Daily.    polyethylene glycol (MIRALAX) 17 g packet Take 17 g by mouth Daily.    fluticasone (FLONASE) 50 MCG/ACT nasal spray 2 sprays into the nostril(s) as directed by provider Daily for 30 days. Administer 2 sprays in each nostril for each dose.    [DISCONTINUED] albuterol (PROVENTIL) (2.5 MG/3ML) 0.083% nebulizer solution Take 2.5 mg by nebulization Every 6 (Six) Hours As Needed for Wheezing or Shortness of Air.    [DISCONTINUED] azithromycin (Zithromax Z-Aleks) 250 MG tablet Take 2 tablets by mouth on day 1, then 1 tablet daily on days 2-5    [DISCONTINUED] brompheniramine-pseudoephedrine-DM 30-2-10 MG/5ML syrup Take 5 mL by mouth 4 (Four) Times a Day As Needed for Cough or Congestion.    [DISCONTINUED] dexAMETHasone (DECADRON) 4 MG tablet Take 1 tablet by mouth Daily With Breakfast.    [DISCONTINUED] ipratropium-albuterol (DUO-NEB) 0.5-2.5 mg/3 ml nebulizer prn treatment     No current facility-administered medications on file prior to visit.      Allergies   Allergen Reactions    Penicillin G Anaphylaxis      Health Maintenance Due   Topic Date Due    Pneumococcal Vaccine 0-64 (1 of 2 - PCV) Never  done    HEPATITIS C SCREENING  Never done    ANNUAL PHYSICAL  Never done    COVID-19 Vaccine (1 - 2023-24 season) Never done      Ashley Costa presents to [facility]  Earache   There is pain in the left ear. This is a new problem. The current episode started in the past 7 days. The problem occurs constantly. The problem has been worsening. There has been no fever. The fever has been present for Less than 1 day. The pain is at a severity of 4/10. Associated symptoms include drainage, headaches and neck pain. Pertinent negatives include no coughing, diarrhea, hearing loss, rash, rhinorrhea, sore throat or vomiting. She has tried acetaminophen for the symptoms. The treatment provided mild relief.     Here with cough with drainage and sinus pressure. Pt states she feels clogged and feels like drainage is running down throat and causing cough. Notes she was dizzy this morning and blames on her ear and feels woozy. Feels like water in her ear. Symptoms x 1 week.   Objective   Vital Signs:   /90 (BP Location: Left arm, Patient Position: Sitting)   Pulse 93   Temp 97.5 °F (36.4 °C) (Temporal)   Wt (!) 144 kg (318 lb 8 oz)   SpO2 98%   BMI 54.67 kg/m²     Review of Systems   HENT:  Positive for congestion, ear pain and tinnitus. Negative for hearing loss, rhinorrhea and sore throat.    Respiratory:  Negative for cough.    Gastrointestinal:  Negative for diarrhea, nausea and vomiting.   Musculoskeletal:  Positive for neck pain.   Skin:  Negative for rash.   Neurological:  Positive for dizziness.   Hematological:  Positive for adenopathy.      Physical Exam  Vitals reviewed.   Constitutional:       General: She is not in acute distress.     Appearance: Normal appearance. She is well-developed.   HENT:      Head: Normocephalic and atraumatic.      Right Ear: Tympanic membrane, ear canal and external ear normal.      Left Ear: Tympanic membrane, ear canal and external ear normal.       Nose: Congestion present.      Right Sinus: Maxillary sinus tenderness present.      Left Sinus: Maxillary sinus tenderness present.   Eyes:      Conjunctiva/sclera: Conjunctivae normal.      Pupils: Pupils are equal, round, and reactive to light.   Cardiovascular:      Rate and Rhythm: Normal rate and regular rhythm.      Heart sounds: Normal heart sounds.   Pulmonary:      Effort: Pulmonary effort is normal.      Breath sounds: Normal breath sounds.   Musculoskeletal:      Cervical back: Neck supple.      Right lower leg: No edema.      Left lower leg: No edema.   Skin:     General: Skin is warm and dry.   Neurological:      Mental Status: She is alert and oriented to person, place, and time.      Cranial Nerves: No cranial nerve deficit.   Psychiatric:         Mood and Affect: Mood and affect normal.         Behavior: Behavior normal.         Thought Content: Thought content normal.         Judgment: Judgment normal.        Result Review :                 Assessment and Plan    Diagnoses and all orders for this visit:    1. Bacterial sinusitis  -     cefdinir (OMNICEF) 300 MG capsule; Take 1 capsule by mouth 2 (Two) Times a Day.  Dispense: 20 capsule; Refill: 0  -     methylPREDNISolone (MEDROL) 4 MG dose pack; Take as directed on package instructions.  Dispense: 1 each; Refill: 0      Treat as acute sinusitis.     Follow Up   Return if symptoms worsen or fail to improve.  Patient was given instructions and counseling regarding her condition or for health maintenance advice. Please see specific information pulled into the AVS if appropriate.       Answers submitted by the patient for this visit:  Primary Reason for Visit (Submitted on 5/15/2024)  What is the primary reason for your visit?: Ear Pain  Ear Pain Questionnaire (Submitted on 5/15/2024)  Chief Complaint: Ear pain  hoarse voice: No  jaw pain: No

## 2024-06-12 ENCOUNTER — PATIENT MESSAGE (OUTPATIENT)
Dept: FAMILY MEDICINE CLINIC | Facility: CLINIC | Age: 44
End: 2024-06-12
Payer: COMMERCIAL

## 2024-06-12 NOTE — TELEPHONE ENCOUNTER
From: Ashley Costa  To: Kecia Gusman  Sent: 6/12/2024 9:05 AM EDT  Subject: Leg pain     Good morning I got a question my legs have been really hurting and wake me at night could you please give me some advice on what to do for them I been taking ibuprofen but still doesn't help thank you

## 2024-06-20 ENCOUNTER — OFFICE VISIT (OUTPATIENT)
Dept: OTOLARYNGOLOGY | Facility: CLINIC | Age: 44
End: 2024-06-20
Payer: COMMERCIAL

## 2024-06-20 VITALS
TEMPERATURE: 97.7 F | HEIGHT: 64 IN | HEART RATE: 86 BPM | SYSTOLIC BLOOD PRESSURE: 158 MMHG | OXYGEN SATURATION: 99 % | BODY MASS INDEX: 50.02 KG/M2 | WEIGHT: 293 LBS | DIASTOLIC BLOOD PRESSURE: 91 MMHG

## 2024-06-20 DIAGNOSIS — R09.81 NASAL CONGESTION: ICD-10-CM

## 2024-06-20 DIAGNOSIS — J34.3 HYPERTROPHY OF BOTH INFERIOR NASAL TURBINATES: ICD-10-CM

## 2024-06-20 DIAGNOSIS — J32.9 CHRONIC RHINOSINUSITIS: Primary | ICD-10-CM

## 2024-06-20 DIAGNOSIS — J30.9 ALLERGIC RHINITIS, UNSPECIFIED SEASONALITY, UNSPECIFIED TRIGGER: ICD-10-CM

## 2024-06-20 DIAGNOSIS — J34.2 NASAL SEPTAL DEVIATION: ICD-10-CM

## 2024-06-20 RX ORDER — CLINDAMYCIN HYDROCHLORIDE 300 MG/1
1 CAPSULE ORAL 3 TIMES DAILY
COMMUNITY
Start: 2024-06-06

## 2024-06-20 RX ORDER — CHLORHEXIDINE GLUCONATE ORAL RINSE 1.2 MG/ML
SOLUTION DENTAL
COMMUNITY
Start: 2024-06-06

## 2024-06-20 RX ORDER — DIAZEPAM 5 MG/1
5 TABLET ORAL
COMMUNITY
Start: 2024-06-06

## 2024-06-20 RX ORDER — IBUPROFEN 600 MG/1
600 TABLET ORAL
COMMUNITY
Start: 2024-06-06

## 2024-06-20 RX ORDER — DOXYCYCLINE HYCLATE 100 MG
1 TABLET ORAL EVERY 12 HOURS SCHEDULED
COMMUNITY
Start: 2024-06-06

## 2024-06-20 RX ORDER — CIPROFLOXACIN 500 MG/1
500 TABLET, FILM COATED ORAL EVERY 12 HOURS SCHEDULED
Qty: 42 TABLET | Refills: 0 | Status: SHIPPED | OUTPATIENT
Start: 2024-06-20 | End: 2024-07-11

## 2024-06-20 NOTE — PROGRESS NOTES
Patient Name: Ashley Costa   Visit Date: 06/20/2024   Patient ID: [id number]  Provider: Garcia Owens MD    Sex: female  Location: [facility] [place] [facility]   YOB: 1980  Location Address: [address] [place] [address], [address] [place] [address],?[place]?[id number]    Primary Care Provider Kecia Gusman APRN  Location Phone: [phone]    Referring Provider: Jose Waters MD        Chief Complaint  Recurrent Sinusitis    History of Present Illness  Ashley Costa is a 43 y.o. female who presents to [facility] today as a consult from Jose Waters MD for evaluation of her sinuses.  She tells me that she has a longstanding history of recurrent sinus infections.  These tend to recur during the change of seasons.  She describes her typical sinus infection as involving facial pressure, nasal congestion, green rhinorrhea, and ear pressure.  She does mention some hyposmia and occasional blood in her nasal drainage but denies any epistaxis.  She does report a previous history of nasal trauma.  She has tried multiple allergy medications including azelastine and is currently using fluticasone and cetirizine.  She sees an allergist.  She does report multiple dental caries for which she is scheduled to undergo extractions in August. CT scan of sinuses without contrast on 1/25/2024 revealed a right nasal septal deviation and spur, minimal frothy mucus in the right sphenoid sinus, moderate right maxillary sinus coastal thickening, and periapical lucencies involving teeth #4, #9, #12.    Past Medical History:   Diagnosis Date    Allergic Age 3    Anxiety     Asthma Since birth    Diverticulosis     Neuromuscular disorder     Obesity     Urinary tract infection 3-4-2022    Burning when peeing       Past Surgical History:   Procedure Laterality Date    D & C AND LAPAROSCOPY      ESSURE TUBAL LIGATION      WISDOM TOOTH EXTRACTION           Current Outpatient Medications:     " cetirizine (zyrTEC) 10 MG tablet, Take 1 tablet by mouth Daily. (Patient not taking: Reported on 6/20/2024), Disp: 90 tablet, Rfl: 3    chlorhexidine (PERIDEX) 0.12 % solution, rinse and spit 1 capfull TWICE DAILY start 3 days before surgery (Patient not taking: Reported on 6/20/2024), Disp: , Rfl:     ciprofloxacin (CIPRO) 500 MG tablet, Take 1 tablet by mouth Every 12 (Twelve) Hours for 21 days., Disp: 42 tablet, Rfl: 0    clindamycin (CLEOCIN) 300 MG capsule, Take 1 capsule by mouth 3 times a day. (Patient not taking: Reported on 6/20/2024), Disp: , Rfl:     diazePAM (VALIUM) 5 MG tablet, Take 1 tablet by mouth every night at bedtime. (Patient not taking: Reported on 6/20/2024), Disp: , Rfl:     doxycycline (VIBRAMYICN) 100 MG tablet, Take 1 tablet by mouth Every 12 (Twelve) Hours. (Patient not taking: Reported on 6/20/2024), Disp: , Rfl:     fluticasone (FLONASE) 50 MCG/ACT nasal spray, 2 sprays into the nostril(s) as directed by provider Daily for 30 days. Administer 2 sprays in each nostril for each dose., Disp: 16 g, Rfl: 2    ibuprofen (ADVIL,MOTRIN) 600 MG tablet, Take 1 tablet by mouth 3 (Three) Times a Day With Meals. (Patient not taking: Reported on 6/20/2024), Disp: , Rfl:     polyethylene glycol (MIRALAX) 17 g packet, Take 17 g by mouth Daily. (Patient not taking: Reported on 6/20/2024), Disp: 30 each, Rfl: 2     Allergies   Allergen Reactions    Penicillin G Anaphylaxis       Social History     Tobacco Use    Smoking status: Every Day     Current packs/day: 1.00     Average packs/day: 1 pack/day for 28.5 years (28.5 ttl pk-yrs)     Types: Cigarettes     Start date: 1/1/1996     Passive exposure: Current    Smokeless tobacco: Never   Vaping Use    Vaping status: Never Used   Substance Use Topics    Alcohol use: Never    Drug use: Never        Objective     Vital Signs:   /91   Pulse 86   Temp 97.7 °F (36.5 °C)   Ht 162.6 cm (64\")   Wt (!) 146 kg (322 lb)   SpO2 99%   BMI 55.27 kg/m²   "     Physical Exam    General: Well developed, well nourished patient of stated age in no acute distress. Voice is strong and clear.   Head: Normocephalic and atraumatic.  Face: No lesions.  Bilateral parotid and submandibular glands are unremarkable.  Stensen's and Warthin's ducts are productive of clear saliva bilaterally.  House-Brackmann I/VI     bilaterally.   muscles and temporomandibular joint nontender to palpation.  No TMJ crepitus.  Eyes: PERRLA, sclerae anicteric, no conjunctival injection. Extraocular movements are intact and full. No nystagmus.   Ears: Auricles are normal in appearance. Bilateral external auditory canals are unremarkable. Bilateral tympanic membranes are clear and without effusion. Hearing normal to conversational voice.   Nose: External nose is normal in appearance. Bilateral nares are patent with normal appearing mucosa. Septum deviated to the left anterosuperiorly and right posteriorly.  Bilateral inferior turbinates are mildly hypertrophied. No lesions.   Oral Cavity: Lips are normal in appearance. Oral mucosa is unremarkable. Gingiva is unremarkable.  Partial, poor dentition for age. Tongue is unremarkable with good movement. Hard palate is unremarkable.   Oropharynx: Soft palate is unremarkable with full movement. Uvula is unremarkable. Bilateral tonsils are unremarkable. Posterior oropharynx is unremarkable.    Larynx and hypopharynx: Deferred secondary to gag reflex.  Neck: Supple.  No mass.  Nontender to palpation.  Trachea midline. Thyroid normal size and without nodules to palpation.   Lymphatic: No lymphadenopathy upon palpation.  Respiratory: Clear to auscultation bilaterally, nonlabored respirations    Cardiovascular: RRR, no murmurs, rubs, or gallops,   Psychiatric: Appropriate affect, cooperative   Neurologic: Oriented x 3, strength symmetric in all extremities, Cranial Nerves II-XII are grossly intact to confrontation   Skin: Warm and dry. No  balaji.    Procedures           Result Review :               Assessment and Plan    Diagnoses and all orders for this visit:    1. Chronic rhinosinusitis (Primary)  -     ciprofloxacin (CIPRO) 500 MG tablet; Take 1 tablet by mouth Every 12 (Twelve) Hours for 21 days.  Dispense: 42 tablet; Refill: 0    2. Nasal congestion    3. Nasal septal deviation    4. Hypertrophy of both inferior nasal turbinates    5. Allergic rhinitis, unspecified seasonality, unspecified trigger      Impressions and findings were discussed at great length.  Currently, she is seen for evaluation of recurrent episodes of facial pressure, nasal congestion, discolored rhinorrhea, and ear pressure.  She also reports baseline hyposmia.  Examination today reveals a nasal septal deviation and partial carious dentition.  We reviewed and discussed the images from her 1/25/2024 CT scan of the sinuses without contrast.  We discussed my concern that her nasal congestion may be more secondary to her inferior turbinate hypertrophy and nasal septal deviation but that the remainder of her symptoms may be consistent with chronic rhinosinusitis.  We discussed the pathophysiology and natural history of this condition.  Options for management were discussed including medical management versus surgical management.  After thorough discussion we will pursue a trial of medical management with an extended course of Cipro.  She tells me that steroids cause palpitations and therefore we will avoid them.  We also discussed saline irrigations and continuing with her fluticasone on a daily basis.  We discussed that her teeth may also be contributing to her symptoms and that I would be curious to see how she does after extractions before advising her further on surgery.  She was given ample time to ask questions, all of which were answered to her satisfaction.      Follow Up   Return in about 4 weeks (around 7/18/2024).  Patient was given instructions and counseling  regarding her condition or for health maintenance advice. Please see specific information pulled into the AVS if appropriate.

## 2025-01-13 ENCOUNTER — TRANSCRIBE ORDERS (OUTPATIENT)
Dept: ADMINISTRATIVE | Facility: HOSPITAL | Age: 45
End: 2025-01-13
Payer: COMMERCIAL

## 2025-01-13 DIAGNOSIS — Z12.31 SCREENING MAMMOGRAM FOR BREAST CANCER: Primary | ICD-10-CM

## 2025-03-12 ENCOUNTER — PATIENT ROUNDING (BHMG ONLY) (OUTPATIENT)
Dept: URGENT CARE | Facility: CLINIC | Age: 45
End: 2025-03-12
Payer: COMMERCIAL

## 2025-03-12 NOTE — ED NOTES
Thank you for letting us care for you in your recent visit to our urgent care center. We would love to hear about your experience with us. Was this the first time you have visited our location?    We're always looking for ways to make our patients' experiences even better. Do you have any recommendations on ways we may improve?     I appreciate you taking the time to respond. Please be on the lookout for a survey about your recent visit from [employer] via text or email. We would greatly appreciate if you could fill that out and turn it back in. We want your voice to be heard and we value your feedback.   Thank you for choosing [facility] for your healthcare needs.

## 2025-04-28 ENCOUNTER — PATIENT MESSAGE (OUTPATIENT)
Dept: FAMILY MEDICINE CLINIC | Facility: CLINIC | Age: 45
End: 2025-04-28
Payer: COMMERCIAL

## 2025-06-12 ENCOUNTER — OFFICE VISIT (OUTPATIENT)
Dept: FAMILY MEDICINE CLINIC | Facility: CLINIC | Age: 45
End: 2025-06-12
Payer: COMMERCIAL

## 2025-06-12 VITALS
BODY MASS INDEX: 50.02 KG/M2 | TEMPERATURE: 96.8 F | SYSTOLIC BLOOD PRESSURE: 136 MMHG | OXYGEN SATURATION: 96 % | HEART RATE: 78 BPM | WEIGHT: 293 LBS | HEIGHT: 64 IN | DIASTOLIC BLOOD PRESSURE: 78 MMHG

## 2025-06-12 DIAGNOSIS — H60.313 ACUTE DIFFUSE OTITIS EXTERNA OF BOTH EARS: Primary | ICD-10-CM

## 2025-06-12 DIAGNOSIS — G47.62 LEG CRAMPS, SLEEP RELATED: ICD-10-CM

## 2025-06-12 DIAGNOSIS — Z11.59 NEED FOR HEPATITIS C SCREENING TEST: ICD-10-CM

## 2025-06-12 DIAGNOSIS — J02.9 SORE THROAT: ICD-10-CM

## 2025-06-12 DIAGNOSIS — H65.03 NON-RECURRENT ACUTE SEROUS OTITIS MEDIA OF BOTH EARS: ICD-10-CM

## 2025-06-12 LAB
ALBUMIN SERPL-MCNC: 4 G/DL (ref 3.5–5.2)
ALBUMIN/GLOB SERPL: 1.3 G/DL
ALP SERPL-CCNC: 104 U/L (ref 39–117)
ALT SERPL W P-5'-P-CCNC: 25 U/L (ref 1–33)
ANION GAP SERPL CALCULATED.3IONS-SCNC: 9.8 MMOL/L (ref 5–15)
AST SERPL-CCNC: 20 U/L (ref 1–32)
BASOPHILS # BLD AUTO: 0.03 10*3/MM3 (ref 0–0.2)
BASOPHILS NFR BLD AUTO: 0.5 % (ref 0–1.5)
BILIRUB SERPL-MCNC: <0.2 MG/DL (ref 0–1.2)
BUN SERPL-MCNC: 16 MG/DL (ref 6–20)
BUN/CREAT SERPL: 23.9 (ref 7–25)
CALCIUM SPEC-SCNC: 10.2 MG/DL (ref 8.6–10.5)
CHLORIDE SERPL-SCNC: 103 MMOL/L (ref 98–107)
CO2 SERPL-SCNC: 25.2 MMOL/L (ref 22–29)
CREAT SERPL-MCNC: 0.67 MG/DL (ref 0.57–1)
DEPRECATED RDW RBC AUTO: 45.4 FL (ref 37–54)
EGFRCR SERPLBLD CKD-EPI 2021: 110.7 ML/MIN/1.73
EOSINOPHIL # BLD AUTO: 0.21 10*3/MM3 (ref 0–0.4)
EOSINOPHIL NFR BLD AUTO: 3.2 % (ref 0.3–6.2)
ERYTHROCYTE [DISTWIDTH] IN BLOOD BY AUTOMATED COUNT: 13.7 % (ref 12.3–15.4)
EXPIRATION DATE: NORMAL
FERRITIN SERPL-MCNC: 71.1 NG/ML (ref 13–150)
GLOBULIN UR ELPH-MCNC: 3 GM/DL
GLUCOSE SERPL-MCNC: 158 MG/DL (ref 65–99)
HCT VFR BLD AUTO: 38.9 % (ref 34–46.6)
HCV AB SER QL: NORMAL
HGB BLD-MCNC: 12.6 G/DL (ref 12–15.9)
IMM GRANULOCYTES # BLD AUTO: 0.01 10*3/MM3 (ref 0–0.05)
IMM GRANULOCYTES NFR BLD AUTO: 0.2 % (ref 0–0.5)
INTERNAL CONTROL: NORMAL
IRON 24H UR-MRATE: 59 MCG/DL (ref 37–145)
IRON SATN MFR SERPL: 13 % (ref 20–50)
LYMPHOCYTES # BLD AUTO: 1.43 10*3/MM3 (ref 0.7–3.1)
LYMPHOCYTES NFR BLD AUTO: 21.7 % (ref 19.6–45.3)
Lab: 367
MAGNESIUM SERPL-MCNC: 2.2 MG/DL (ref 1.6–2.6)
MCH RBC QN AUTO: 29.2 PG (ref 26.6–33)
MCHC RBC AUTO-ENTMCNC: 32.4 G/DL (ref 31.5–35.7)
MCV RBC AUTO: 90.3 FL (ref 79–97)
MONOCYTES # BLD AUTO: 0.41 10*3/MM3 (ref 0.1–0.9)
MONOCYTES NFR BLD AUTO: 6.2 % (ref 5–12)
NEUTROPHILS NFR BLD AUTO: 4.51 10*3/MM3 (ref 1.7–7)
NEUTROPHILS NFR BLD AUTO: 68.2 % (ref 42.7–76)
NRBC BLD AUTO-RTO: 0 /100 WBC (ref 0–0.2)
PLATELET # BLD AUTO: 221 10*3/MM3 (ref 140–450)
PMV BLD AUTO: 10.2 FL (ref 6–12)
POTASSIUM SERPL-SCNC: 4.5 MMOL/L (ref 3.5–5.2)
PROT SERPL-MCNC: 7 G/DL (ref 6–8.5)
RBC # BLD AUTO: 4.31 10*6/MM3 (ref 3.77–5.28)
S PYO AG THROAT QL: NEGATIVE
SODIUM SERPL-SCNC: 138 MMOL/L (ref 136–145)
TIBC SERPL-MCNC: 456 MCG/DL (ref 298–536)
TRANSFERRIN SERPL-MCNC: 306 MG/DL (ref 200–360)
WBC NRBC COR # BLD AUTO: 6.6 10*3/MM3 (ref 3.4–10.8)

## 2025-06-12 PROCEDURE — 1125F AMNT PAIN NOTED PAIN PRSNT: CPT | Performed by: STUDENT IN AN ORGANIZED HEALTH CARE EDUCATION/TRAINING PROGRAM

## 2025-06-12 PROCEDURE — 99214 OFFICE O/P EST MOD 30 MIN: CPT | Performed by: STUDENT IN AN ORGANIZED HEALTH CARE EDUCATION/TRAINING PROGRAM

## 2025-06-12 PROCEDURE — 85025 COMPLETE CBC W/AUTO DIFF WBC: CPT | Performed by: STUDENT IN AN ORGANIZED HEALTH CARE EDUCATION/TRAINING PROGRAM

## 2025-06-12 PROCEDURE — 83540 ASSAY OF IRON: CPT | Performed by: STUDENT IN AN ORGANIZED HEALTH CARE EDUCATION/TRAINING PROGRAM

## 2025-06-12 PROCEDURE — 80053 COMPREHEN METABOLIC PANEL: CPT | Performed by: STUDENT IN AN ORGANIZED HEALTH CARE EDUCATION/TRAINING PROGRAM

## 2025-06-12 PROCEDURE — 82728 ASSAY OF FERRITIN: CPT | Performed by: STUDENT IN AN ORGANIZED HEALTH CARE EDUCATION/TRAINING PROGRAM

## 2025-06-12 PROCEDURE — 83735 ASSAY OF MAGNESIUM: CPT | Performed by: STUDENT IN AN ORGANIZED HEALTH CARE EDUCATION/TRAINING PROGRAM

## 2025-06-12 PROCEDURE — 87880 STREP A ASSAY W/OPTIC: CPT | Performed by: STUDENT IN AN ORGANIZED HEALTH CARE EDUCATION/TRAINING PROGRAM

## 2025-06-12 PROCEDURE — 86803 HEPATITIS C AB TEST: CPT | Performed by: STUDENT IN AN ORGANIZED HEALTH CARE EDUCATION/TRAINING PROGRAM

## 2025-06-12 PROCEDURE — 84466 ASSAY OF TRANSFERRIN: CPT | Performed by: STUDENT IN AN ORGANIZED HEALTH CARE EDUCATION/TRAINING PROGRAM

## 2025-06-12 RX ORDER — HYDROCORTISONE AND ACETIC ACID 20.75; 10.375 MG/ML; MG/ML
3 SOLUTION AURICULAR (OTIC) 2 TIMES DAILY
Qty: 10 ML | Refills: 0 | Status: SHIPPED | OUTPATIENT
Start: 2025-06-12 | End: 2025-06-19

## 2025-06-12 RX ORDER — DOXYCYCLINE 100 MG/1
100 CAPSULE ORAL 2 TIMES DAILY
Qty: 10 CAPSULE | Refills: 0 | Status: SHIPPED | OUTPATIENT
Start: 2025-06-12 | End: 2025-06-17

## 2025-06-12 NOTE — PROGRESS NOTES
Chief Complaint  Sore Throat (Sore throat x one week and ear pain ) and Spasms (Muscle spasms in legs for several months )    Subjective      Ashley Costa is a 44 y.o. female who presents to [facility]       History of Present Illness  The patient is a 44-year-old female who presents today for evaluation of an earache and a sore throat.    She reports experiencing severe pain in her left ear, which has been ongoing for several days. Additionally, she notes swelling on the left side of her throat, causing discomfort during swallowing. A strep test was performed, and she experienced gagging during the procedure. She recalls a similar episode affecting her right side last week, which was resolved with cephalexin, which she had leftover at home from a previous infection.  She did not have a full course. However, the symptoms have now recurred on the left side. She has been experiencing fever and chills. She does not submerge her head or ears in water while bathing and uses earplugs when washing her hair to prevent water from entering her ears. She has a history of chronic sinus issues and has been using a nasal spray without significant relief. She has also tried over-the-counter saline rinses as recommended by her cardiologist, but these have not been effective. She has a history of recurrent ear infections following hair washing. She is scheduled for a sleep study in 07/2025 due to snoring, which she believes may be related to her sinus condition. She also notes her ears have been itchy and are sore to the touch.    She reports difficulty sleeping at night due to frequent awakenings every 1 to 2 hours caused by muscle spasms in her legs and feet. Despite taking Tylenol and ibuprofen, she finds no relief and has resorted to soaking in hot water in the bathtub. She has been consuming bananas and milk as natural remedies. Her gynecologist suggested that her symptoms could be related  "to menopause, while her cardiologist recommended further evaluation. She anticipates elevated blood sugar levels due to recent dietary habits, including increased intake of sweets and coffee. She has a long-standing history of restless leg syndrome since childhood.        Objective   Vital Signs:   Vitals:    06/12/25 1252   BP: 136/78   Pulse: 78   Temp: 96.8 °F (36 °C)   SpO2: 96%   Weight: (!) 145 kg (318 lb 9.6 oz)   Height: 161.3 cm (63.5\")     Body mass index is 55.55 kg/m².    Wt Readings from Last 3 Encounters:   06/12/25 (!) 145 kg (318 lb 9.6 oz)   03/11/25 (!) 146 kg (321 lb)   06/20/24 (!) 146 kg (322 lb)     BP Readings from Last 3 Encounters:   06/12/25 136/78   03/11/25 (!) 181/107   06/20/24 158/91       Health Maintenance   Topic Date Due    Pneumococcal Vaccine 0-49 (1 of 2 - PCV) Never done    HEPATITIS C SCREENING  Never done    ANNUAL PHYSICAL  Never done    PAP SMEAR  06/17/2024    COVID-19 Vaccine (1 - 2024-25 season) Never done    INFLUENZA VACCINE  07/01/2025    MAMMOGRAM  12/11/2025    TDAP/TD VACCINES (2 - Td or Tdap) 11/08/2028       Physical Exam  HENT:      Head: Normocephalic and atraumatic.      Right Ear: Hearing and external ear normal. Tenderness present. A middle ear effusion is present. Tympanic membrane is erythematous.      Left Ear: Hearing and external ear normal. Tenderness present. A middle ear effusion is present. Tympanic membrane is erythematous.      Ears:      Comments: Bilateral canals erythematous with the left being worse than the right  Eyes:      Extraocular Movements: Extraocular movements intact.      Conjunctiva/sclera: Conjunctivae normal.      Pupils: Pupils are equal, round, and reactive to light.   Cardiovascular:      Rate and Rhythm: Normal rate and regular rhythm.      Heart sounds: Normal heart sounds.   Pulmonary:      Effort: Pulmonary effort is normal.   Abdominal:      General: Abdomen is flat.      Palpations: Abdomen is soft.   Musculoskeletal:    "      General: Normal range of motion.   Skin:     General: Skin is warm and dry.   Neurological:      General: No focal deficit present.      Mental Status: She is alert.          Result Review :  The following data was reviewed by: Lisa Alvarado DO on 06/12/2025:         Procedures          ASSESSMENT/PLAN  Diagnoses and all orders for this visit:    1. Acute diffuse otitis externa of both ears (Primary)  -     acetic acid-hydrocortisone (VOSOL-HC) 1-2 % otic solution; Administer 3 drops into both ears 2 (Two) Times a Day for 7 days.  Dispense: 10 mL; Refill: 0    2. Non-recurrent acute serous otitis media of both ears  -     doxycycline (MONODOX) 100 MG capsule; Take 1 capsule by mouth 2 (Two) Times a Day for 5 days.  Dispense: 10 capsule; Refill: 0    3. Need for hepatitis C screening test  -     Hepatitis C Antibody    4. Leg cramps, sleep related  -     CBC Auto Differential  -     Comprehensive Metabolic Panel  -     Iron Profile w/o Ferritin  -     Magnesium  -     Ferritin  -     Hepatitis C Antibody    5. Sore throat  -     POCT rapid strep A          Assessment & Plan  1. Otitis Externa.  - Symptoms suggest an external ear infection.  - Increased pain and limited mobility.  - Prescribed Acetasol to manage the infection.    2. Otitis Media.  - Symptoms suggest an internal ear infection.  - Increased pain and limited mobility.  - Prescribed an oral antibiotic, keflex, to manage the infection    3. Muscle Spasms.  - Experiences muscle spasms in legs and feet, possibly due to iron deficiency or abnormal potassium levels.  - Magnesium supplementation recommended.  - Laboratory tests ordered to assess iron and potassium levels.  - Medication for restless leg syndrome may be considered if laboratory results are normal.    4. Health Maintenance.  - Pap smear is due.  - Hepatitis C screening will be added to the laboratory tests.          FOLLOW UP  No follow-ups on file.  Patient was given instructions and  counseling regarding her condition or for health maintenance advice. Please see specific information pulled into the AVS if appropriate.       Lisa Alvarado DO  06/12/25  14:13 EDT    Patient or patient representative verbalized consent for the use of Ambient Listening during the visit with  Lisa Alvarado DO for chart documentation. 6/12/2025  14:16 EDT

## 2025-06-13 DIAGNOSIS — G47.62 LEG CRAMPS, SLEEP RELATED: Primary | ICD-10-CM

## 2025-06-13 DIAGNOSIS — G25.81 RESTLESS LEG SYNDROME: ICD-10-CM

## 2025-06-13 RX ORDER — ROPINIROLE 0.5 MG/1
0.5 TABLET, FILM COATED ORAL NIGHTLY
Qty: 30 TABLET | Refills: 3 | Status: SHIPPED | OUTPATIENT
Start: 2025-06-13

## 2025-06-19 RX ORDER — AZITHROMYCIN 500 MG/1
500 TABLET, FILM COATED ORAL DAILY
Qty: 7 TABLET | Refills: 0 | Status: SHIPPED | OUTPATIENT
Start: 2025-06-19 | End: 2025-06-26

## 2025-08-06 ENCOUNTER — PATIENT MESSAGE (OUTPATIENT)
Dept: FAMILY MEDICINE CLINIC | Facility: CLINIC | Age: 45
End: 2025-08-06
Payer: COMMERCIAL